# Patient Record
Sex: FEMALE | Race: WHITE | NOT HISPANIC OR LATINO | Employment: OTHER | URBAN - METROPOLITAN AREA
[De-identification: names, ages, dates, MRNs, and addresses within clinical notes are randomized per-mention and may not be internally consistent; named-entity substitution may affect disease eponyms.]

---

## 2022-12-29 ENCOUNTER — HOSPITAL ENCOUNTER (INPATIENT)
Facility: HOSPITAL | Age: 87
LOS: 3 days | Discharge: HOSPICE | DRG: 640 | End: 2023-01-06
Attending: INTERNAL MEDICINE | Admitting: INTERNAL MEDICINE
Payer: MEDICARE

## 2022-12-29 DIAGNOSIS — R29.898 LEFT LEG WEAKNESS: ICD-10-CM

## 2022-12-29 DIAGNOSIS — I63.9 STROKE: ICD-10-CM
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
## 2022-12-29 DIAGNOSIS — R29.90 STROKE-LIKE SYMPTOMS: ICD-10-CM

## 2022-12-29 DIAGNOSIS — M79.606 LEG PAIN: ICD-10-CM

## 2022-12-29 DIAGNOSIS — I63.9 CEREBROVASCULAR ACCIDENT (CVA), UNSPECIFIED MECHANISM: ICD-10-CM

## 2022-12-29 LAB
ALBUMIN SERPL-MCNC: 3.7 G/DL (ref 3.4–4.8)
ALBUMIN/GLOB SERPL: 1.2 RATIO (ref 1.1–2)
ALP SERPL-CCNC: 98 UNIT/L (ref 40–150)
ALT SERPL-CCNC: 12 UNIT/L (ref 0–55)
APPEARANCE UR: CLEAR
AST SERPL-CCNC: 20 UNIT/L (ref 5–34)
BACTERIA #/AREA URNS AUTO: ABNORMAL /HPF
BASOPHILS # BLD AUTO: 0.01 X10(3)/MCL (ref 0–0.2)
BASOPHILS NFR BLD AUTO: 0.2 %
BILIRUB UR QL STRIP.AUTO: NEGATIVE MG/DL
BILIRUBIN DIRECT+TOT PNL SERPL-MCNC: 0.5 MG/DL
BUN SERPL-MCNC: 25.7 MG/DL (ref 9.8–20.1)
CALCIUM SERPL-MCNC: 9.3 MG/DL (ref 8.4–10.2)
CHLORIDE SERPL-SCNC: 106 MMOL/L (ref 98–111)
CO2 SERPL-SCNC: 28 MMOL/L (ref 23–31)
COLOR UR AUTO: ABNORMAL
CREAT SERPL-MCNC: 1.05 MG/DL (ref 0.55–1.02)
EOSINOPHIL # BLD AUTO: 0.15 X10(3)/MCL (ref 0–0.9)
EOSINOPHIL NFR BLD AUTO: 2.9 %
ERYTHROCYTE [DISTWIDTH] IN BLOOD BY AUTOMATED COUNT: 13.5 % (ref 11–14.5)
FLUAV AG UPPER RESP QL IA.RAPID: NOT DETECTED
FLUBV AG UPPER RESP QL IA.RAPID: NOT DETECTED
GFR SERPLBLD CREATININE-BSD FMLA CKD-EPI: 49 MLS/MIN/1.73/M2
GLOBULIN SER-MCNC: 3.2 GM/DL (ref 2.4–3.5)
GLUCOSE SERPL-MCNC: 87 MG/DL (ref 75–121)
GLUCOSE UR QL STRIP.AUTO: NEGATIVE MG/DL
HCT VFR BLD AUTO: 43.1 % (ref 37–47)
HGB BLD-MCNC: 13.9 GM/DL (ref 12–16)
IMM GRANULOCYTES # BLD AUTO: 0.01 X10(3)/MCL (ref 0–0.04)
IMM GRANULOCYTES NFR BLD AUTO: 0.2 %
INR BLD: 0.98 (ref 2–3)
KETONES UR QL STRIP.AUTO: NEGATIVE MG/DL
LEUKOCYTE ESTERASE UR QL STRIP.AUTO: NEGATIVE UNIT/L
LYMPHOCYTES # BLD AUTO: 1.49 X10(3)/MCL (ref 0.6–4.6)
LYMPHOCYTES NFR BLD AUTO: 28.3 %
MAGNESIUM SERPL-MCNC: 1.9 MG/DL (ref 1.6–2.6)
MCH RBC QN AUTO: 29.8 PG
MCHC RBC AUTO-ENTMCNC: 32.3 MG/DL (ref 33–36)
MCV RBC AUTO: 92.5 FL (ref 80–94)
MONOCYTES # BLD AUTO: 0.54 X10(3)/MCL (ref 0.1–1.3)
MONOCYTES NFR BLD AUTO: 10.3 %
NEUTROPHILS # BLD AUTO: 3.06 X10(3)/MCL (ref 2.1–9.2)
NEUTROPHILS NFR BLD AUTO: 58.1 %
NITRITE UR QL STRIP.AUTO: NEGATIVE
NRBC BLD AUTO-RTO: 0 % (ref 0–1)
PH UR STRIP.AUTO: 6 [PH]
PLATELET # BLD AUTO: 163 X10(3)/MCL (ref 140–371)
PMV BLD AUTO: 9.3 FL (ref 9.4–12.4)
POTASSIUM SERPL-SCNC: 3.9 MMOL/L (ref 3.5–5.1)
PROT SERPL-MCNC: 6.9 GM/DL (ref 5.8–7.6)
PROT UR QL STRIP.AUTO: ABNORMAL MG/DL
PROTHROMBIN TIME: 12.8 SECONDS (ref 11.7–14.5)
RBC # BLD AUTO: 4.66 X10(6)/MCL (ref 4.2–5.4)
RBC #/AREA URNS AUTO: ABNORMAL /HPF
RBC UR QL AUTO: ABNORMAL UNIT/L
SARS-COV-2 RNA RESP QL NAA+PROBE: NOT DETECTED
SODIUM SERPL-SCNC: 143 MMOL/L (ref 132–146)
SP GR UR STRIP.AUTO: 1.02
SQUAMOUS #/AREA URNS AUTO: ABNORMAL /HPF
UROBILINOGEN UR STRIP-ACNC: 0.2 MG/DL
WBC # SPEC AUTO: 5.3 X10(3)/MCL (ref 4.5–11.5)
WBC #/AREA URNS AUTO: ABNORMAL /HPF

## 2022-12-29 PROCEDURE — 81003 URINALYSIS AUTO W/O SCOPE: CPT | Performed by: INTERNAL MEDICINE

## 2022-12-29 PROCEDURE — 83735 ASSAY OF MAGNESIUM: CPT | Performed by: INTERNAL MEDICINE

## 2022-12-29 PROCEDURE — 25000003 PHARM REV CODE 250: Performed by: INTERNAL MEDICINE

## 2022-12-29 PROCEDURE — 85025 COMPLETE CBC W/AUTO DIFF WBC: CPT | Performed by: INTERNAL MEDICINE

## 2022-12-29 PROCEDURE — 63600175 PHARM REV CODE 636 W HCPCS: Performed by: INTERNAL MEDICINE

## 2022-12-29 PROCEDURE — 80053 COMPREHEN METABOLIC PANEL: CPT | Performed by: INTERNAL MEDICINE

## 2022-12-29 PROCEDURE — 85610 PROTHROMBIN TIME: CPT | Performed by: INTERNAL MEDICINE

## 2022-12-29 PROCEDURE — 0240U COVID/FLU A&B PCR: CPT | Performed by: INTERNAL MEDICINE

## 2022-12-29 PROCEDURE — 96360 HYDRATION IV INFUSION INIT: CPT

## 2022-12-29 PROCEDURE — 96372 THER/PROPH/DIAG INJ SC/IM: CPT | Performed by: INTERNAL MEDICINE

## 2022-12-29 PROCEDURE — G0378 HOSPITAL OBSERVATION PER HR: HCPCS

## 2022-12-29 PROCEDURE — 99291 CRITICAL CARE FIRST HOUR: CPT

## 2022-12-29 RX ORDER — VORTIOXETINE 10 MG/1
1 TABLET, FILM COATED ORAL
Status: ON HOLD | COMMUNITY
Start: 2022-12-24 | End: 2023-01-06 | Stop reason: HOSPADM

## 2022-12-29 RX ORDER — NAPROXEN SODIUM 220 MG/1
162 TABLET, FILM COATED ORAL ONCE
Status: COMPLETED | OUTPATIENT
Start: 2022-12-29 | End: 2022-12-29

## 2022-12-29 RX ORDER — ATORVASTATIN CALCIUM 20 MG/1
TABLET, FILM COATED ORAL
COMMUNITY

## 2022-12-29 RX ORDER — ATORVASTATIN CALCIUM 10 MG/1
20 TABLET, FILM COATED ORAL NIGHTLY
Status: DISCONTINUED | OUTPATIENT
Start: 2022-12-29 | End: 2023-01-06 | Stop reason: HOSPADM

## 2022-12-29 RX ORDER — ACETAMINOPHEN 325 MG/1
650 TABLET ORAL EVERY 8 HOURS PRN
Status: DISCONTINUED | OUTPATIENT
Start: 2022-12-29 | End: 2023-01-06 | Stop reason: HOSPADM

## 2022-12-29 RX ORDER — SODIUM CHLORIDE 0.9 % (FLUSH) 0.9 %
10 SYRINGE (ML) INJECTION
Status: DISCONTINUED | OUTPATIENT
Start: 2022-12-29 | End: 2023-01-06 | Stop reason: HOSPADM

## 2022-12-29 RX ORDER — ENOXAPARIN SODIUM 100 MG/ML
30 INJECTION SUBCUTANEOUS EVERY 24 HOURS
Status: DISCONTINUED | OUTPATIENT
Start: 2022-12-29 | End: 2023-01-03

## 2022-12-29 RX ORDER — TALC
6 POWDER (GRAM) TOPICAL NIGHTLY PRN
Status: DISCONTINUED | OUTPATIENT
Start: 2022-12-29 | End: 2023-01-06 | Stop reason: HOSPADM

## 2022-12-29 RX ORDER — BUPROPION HYDROCHLORIDE 150 MG/1
150 TABLET ORAL DAILY
Status: DISCONTINUED | OUTPATIENT
Start: 2022-12-30 | End: 2023-01-06 | Stop reason: HOSPADM

## 2022-12-29 RX ORDER — LISINOPRIL 20 MG/1
TABLET ORAL
Status: ON HOLD | COMMUNITY
End: 2023-01-06 | Stop reason: HOSPADM

## 2022-12-29 RX ORDER — METOPROLOL TARTRATE 50 MG/1
50 TABLET ORAL 2 TIMES DAILY
Status: ON HOLD | COMMUNITY
Start: 2022-10-10 | End: 2023-01-06 | Stop reason: HOSPADM

## 2022-12-29 RX ORDER — BUPROPION HYDROCHLORIDE 150 MG/1
150 TABLET ORAL
Status: ON HOLD | COMMUNITY
Start: 2022-12-15 | End: 2023-01-06 | Stop reason: SDUPTHER

## 2022-12-29 RX ORDER — FUROSEMIDE 20 MG/1
TABLET ORAL
Status: ON HOLD | COMMUNITY
End: 2023-01-06 | Stop reason: HOSPADM

## 2022-12-29 RX ORDER — ACETAMINOPHEN 325 MG/1
650 TABLET ORAL
Status: DISCONTINUED | OUTPATIENT
Start: 2022-12-29 | End: 2022-12-29

## 2022-12-29 RX ORDER — AMLODIPINE BESYLATE 10 MG/1
10 TABLET ORAL
COMMUNITY
Start: 2022-12-24

## 2022-12-29 RX ORDER — ASPIRIN 325 MG
325 TABLET, DELAYED RELEASE (ENTERIC COATED) ORAL DAILY
Status: DISCONTINUED | OUTPATIENT
Start: 2022-12-30 | End: 2023-01-02

## 2022-12-29 RX ORDER — TRAMADOL HYDROCHLORIDE 50 MG/1
50 TABLET ORAL EVERY 6 HOURS PRN
Qty: 8 TABLET | Refills: 0 | Status: SHIPPED | OUTPATIENT
Start: 2022-12-29 | End: 2022-12-29

## 2022-12-29 RX ORDER — ASCORBIC ACID 500 MG
500 TABLET ORAL
Status: ON HOLD | COMMUNITY
End: 2023-01-06 | Stop reason: HOSPADM

## 2022-12-29 RX ORDER — LISINOPRIL 20 MG/1
20 TABLET ORAL DAILY
Status: DISCONTINUED | OUTPATIENT
Start: 2022-12-30 | End: 2022-12-31

## 2022-12-29 RX ORDER — ASPIRIN 81 MG/1
TABLET ORAL
COMMUNITY

## 2022-12-29 RX ORDER — ONDANSETRON 2 MG/ML
4 INJECTION INTRAMUSCULAR; INTRAVENOUS EVERY 8 HOURS PRN
Status: DISCONTINUED | OUTPATIENT
Start: 2022-12-29 | End: 2023-01-06 | Stop reason: HOSPADM

## 2022-12-29 RX ORDER — LABETALOL HCL 20 MG/4 ML
10 SYRINGE (ML) INTRAVENOUS
Status: DISCONTINUED | OUTPATIENT
Start: 2022-12-29 | End: 2022-12-30

## 2022-12-29 RX ORDER — ACETAMINOPHEN 325 MG/1
650 TABLET ORAL EVERY 6 HOURS PRN
Status: DISCONTINUED | OUTPATIENT
Start: 2022-12-29 | End: 2023-01-06 | Stop reason: HOSPADM

## 2022-12-29 RX ADMIN — ATORVASTATIN CALCIUM 20 MG: 10 TABLET, FILM COATED ORAL at 08:12

## 2022-12-29 RX ADMIN — ASPIRIN 81 MG CHEWABLE TABLET 162 MG: 81 TABLET CHEWABLE at 01:12

## 2022-12-29 RX ADMIN — SODIUM CHLORIDE 500 ML: 9 INJECTION, SOLUTION INTRAVENOUS at 11:12

## 2022-12-29 RX ADMIN — ENOXAPARIN SODIUM 30 MG: 30 INJECTION SUBCUTANEOUS at 08:12

## 2022-12-29 NOTE — ED PROVIDER NOTES
"     Source of History:  Patient, Son-in-law, moderate limitations due to dementia    Chief complaint:  Altered Mental Status (c/o increasing weakness, confusion and sleepiness X 24 hours with chills and left hip pain)      HPI:  Soila Buck is a 89 y.o. female presenting with Altered Mental Status (c/o increasing weakness, confusion and sleepiness X 24 hours with chills and left hip pain)       Difficult HPI as patient has a history of dementia, 89-year-old female nursing home resident with history of HTN, HLD, dementia, presents with generalized weakness and possible left hip injury, onset yesterday, on exam patient is complaining of left hip pain, no obvious deformity, no headache, no chest pain, no abdominal pain    Patient presents for evaluation of weakness, difficulty standing, left leg pain. Onset of symptoms was abrupt starting 1 day ago, with unchanged symptoms since that time. This episode occurred at a nursing home. The symptoms are associated with weakness of left leg.          Review of Systems   Unable to obtain due to dementia    Review of patient's allergies indicates:   Allergen Reactions    Meperidine        PMH:  As per HPI and below:    Past Medical History:   Diagnosis Date    Carotid artery syndrome     CKD (chronic kidney disease) stage 3, GFR 30-59 ml/min     GERD (gastroesophageal reflux disease)     Hypertension     Major depressive disorder, single episode, unspecified     Mild cognitive impairment with memory loss     Mixed hyperlipidemia     PVD (peripheral vascular disease)         No family history on file.    No past surgical history on file.         There is no problem list on file for this patient.       Physical Exam:    BP (!) 162/84   Pulse 69   Temp 99 °F (37.2 °C) (Oral)   Resp 18   Ht 5' 2" (1.575 m)   Wt 102.1 kg (225 lb)   LMP  (LMP Unknown)   SpO2 97%   BMI 41.15 kg/m²     Nursing note and vital signs reviewed.    General:  Alert, moderate distress complaining of " left hip pain  Skin: Normal for Ethnic Origin, No cyanosis, no obvious bruising  HEENT: Normocephalic and atraumatic, Vision unchanged, Pupils symmetric, No icterus , Nasal mucosa is pink and moist  Cardiovascular:  Regular rate and rhythm, No edema  Chest Wall: No deformity, equal chest rise  Respiratory:  Lungs are clear to auscultation, respirations are non-labored.    Musculoskeletal:  No deformity, Normal perfusion to all extremities, pain with light touch to left hip area  Gastrointestinal:  Soft, Non distended  Neurological:  Alert and oriented x 1, GCS 14, moves all 4 extremities, possible left lower extremity drift, normal speech observed.    Psychiatric:  Cooperative, appropriate mood & affect.        Labs that have been ordered have been independently reviewed and interpreted by myself.     Old Chart Reviewed.      Initial Impression/ Differential Dx:  Hypoglycemia, intoxication, CNS abnormality including CVA, TIA, SAH, HTN encephalopathy, cerebral edema, tumor, also includes metabolic causea such as hypo/hyperglycemia, electrolyte abnormality, hypothermia, thyroid disease, hypoxia, hypercarbia, medication side effect, infectious causes including meningitis, encephalitis, UTI, PNA, skin infection, viral syndrome intraabdominal infection, hypoperfusion, psychiatric disorder, migraine, psychosis       MDM:      Reviewed Nurses Note.    Reviewed Pertinent old records.    Orders Placed This Encounter    CT Head Without Contrast    X-Ray Hip 2 or 3 views Left (with Pelvis when performed)    COVID/FLU A&B PCR    CBC Auto Differential    Comprehensive Metabolic Panel    Protime-INR    Magnesium    Urinalysis, Reflex to Urine Culture Urine, Clean Catch    CBC with Differential    Urinalysis, Microscopic    Insert peripheral IV    Place in Observation    sodium chloride 0.9% bolus 500 mL 500 mL    aspirin chewable tablet 162 mg                    Labs Reviewed   COMPREHENSIVE METABOLIC PANEL - Abnormal; Notable  for the following components:       Result Value    Blood Urea Nitrogen 25.7 (*)     Creatinine 1.05 (*)     All other components within normal limits   PROTIME-INR - Abnormal; Notable for the following components:    INR 0.98 (*)     All other components within normal limits   URINALYSIS, REFLEX TO URINE CULTURE - Abnormal; Notable for the following components:    Protein, UA Trace (*)     Blood, UA Trace (*)     All other components within normal limits   CBC WITH DIFFERENTIAL - Abnormal; Notable for the following components:    MCHC 32.3 (*)     MPV 9.3 (*)     All other components within normal limits   URINALYSIS, MICROSCOPIC - Abnormal; Notable for the following components:    Squamous Epithelial Cells, UA Few (*)     All other components within normal limits    Narrative:     Urine microscopic results may be inaccurate, <12 cc sample submitted   COVID/FLU A&B PCR - Normal    Narrative:     The Xpert Xpress SARS-CoV-2/FLU/RSV plus is a rapid, multiplexed real-time PCR test intended for the simultaneous qualitative detection and differentiation of SARS-CoV-2, Influenza A, Influenza B, and respiratory syncytial virus (RSV) viral RNA in either nasopharyngeal swab or nasal swab specimens.         MAGNESIUM - Normal   CBC W/ AUTO DIFFERENTIAL    Narrative:     The following orders were created for panel order CBC Auto Differential.  Procedure                               Abnormality         Status                     ---------                               -----------         ------                     CBC with Differential[[id number]]        Abnormal            Final result                 Please view results for these tests on the individual orders.          CT Head Without Contrast   Final Result      1.  Right posterior frontal lobe nonhemorrhagic infarct may be subacute to old.      2.  Chronic microvascular ischemia and atrophy.         Electronically signed by: Avi Jones   Date:    12/29/2022    Time:    13:06      X-Ray Hip 2 or 3 views Left (with Pelvis when performed)   Final Result      No displaced fracture identified.         Electronically signed by: Gina Orellana   Date:    12/29/2022   Time:    12:57           Admission on 12/29/2022   Component Date Value Ref Range Status    Influenza A PCR 12/29/2022 Not Detected  Not Detected Final    Influenza B PCR 12/29/2022 Not Detected  Not Detected Final    SARS-CoV-2 PCR 12/29/2022 Not Detected  Not Detected Final    Sodium Level 12/29/2022 143  132 - 146 mmol/L Final    Potassium Level 12/29/2022 3.9  3.5 - 5.1 mmol/L Final    Chloride 12/29/2022 106  98 - 111 mmol/L Final    Carbon Dioxide 12/29/2022 28  23 - 31 mmol/L Final    Glucose Level 12/29/2022 87  75 - 121 mg/dL Final    Blood Urea Nitrogen 12/29/2022 25.7 (H)  9.8 - 20.1 mg/dL Final    Creatinine 12/29/2022 1.05 (H)  0.55 - 1.02 mg/dL Final    Calcium Level Total 12/29/2022 9.3  8.4 - 10.2 mg/dL Final    Protein Total 12/29/2022 6.9  5.8 - 7.6 gm/dL Final    Albumin Level 12/29/2022 3.7  3.4 - 4.8 g/dL Final    Globulin 12/29/2022 3.2  2.4 - 3.5 gm/dL Final    Albumin/Globulin Ratio 12/29/2022 1.2  1.1 - 2.0 ratio Final    Bilirubin Total 12/29/2022 0.5  <=1.5 mg/dL Final    Alkaline Phosphatase 12/29/2022 98  40 - 150 unit/L Final    Alanine Aminotransferase 12/29/2022 12  0 - 55 unit/L Final    Aspartate Aminotransferase 12/29/2022 20  5 - 34 unit/L Final    eGFR 12/29/2022 49  mls/min/1.73/m2 Final    PT 12/29/2022 12.8  11.7 - 14.5 seconds Final    INR 12/29/2022 0.98 (L)  2.00 - 3.00 Final    Magnesium Level 12/29/2022 1.90  1.60 - 2.60 mg/dL Final    Color, UA 12/29/2022 Straw  Yellow, Light-Yellow, Dark Yellow, Cassidy, Straw Final    Appearance, UA 12/29/2022 Clear  Clear Final    Specific Gravity, UA 12/29/2022 1.025   Final    pH, UA 12/29/2022 6.0  5.0 - 8.5 Final    Protein, UA 12/29/2022 Trace (A)  Negative mg/dL Final    Glucose, UA 12/29/2022 Negative  Negative, Normal mg/dL  Final    Ketones, UA 12/29/2022 Negative  Negative mg/dL Final    Blood, UA 12/29/2022 Trace (A)  Negative unit/L Final    Bilirubin, UA 12/29/2022 Negative  Negative mg/dL Final    Urobilinogen, UA 12/29/2022 0.2  0.2, 1.0, Normal mg/dL Final    Nitrites, UA 12/29/2022 Negative  Negative Final    Leukocyte Esterase, UA 12/29/2022 Negative  Negative unit/L Final    WBC 12/29/2022 5.3  4.5 - 11.5 x10(3)/mcL Final    RBC 12/29/2022 4.66  4.20 - 5.40 x10(6)/mcL Final    Hgb 12/29/2022 13.9  12.0 - 16.0 gm/dL Final    Hct 12/29/2022 43.1  37.0 - 47.0 % Final    MCV 12/29/2022 92.5  80.0 - 94.0 fL Final    MCH 12/29/2022 29.8  pg Final    MCHC 12/29/2022 32.3 (L)  33.0 - 36.0 mg/dL Final    RDW 12/29/2022 13.5  11.0 - 14.5 % Final    Platelet 12/29/2022 163  140 - 371 x10(3)/mcL Final    MPV 12/29/2022 9.3 (L)  9.4 - 12.4 fL Final    Neut % 12/29/2022 58.1  % Final    Lymph % 12/29/2022 28.3  % Final    Mono % 12/29/2022 10.3  % Final    Eos % 12/29/2022 2.9  % Final    Basophil % 12/29/2022 0.2  % Final    Lymph # 12/29/2022 1.49  0.6 - 4.6 x10(3)/mcL Final    Neut # 12/29/2022 3.06  2.1 - 9.2 x10(3)/mcL Final    Mono # 12/29/2022 0.54  0.1 - 1.3 x10(3)/mcL Final    Eos # 12/29/2022 0.15  0 - 0.9 x10(3)/mcL Final    Baso # 12/29/2022 0.01  0 - 0.2 x10(3)/mcL Final    IG# 12/29/2022 0.01  0 - 0.04 x10(3)/mcL Final    IG% 12/29/2022 0.2  % Final    NRBC% 12/29/2022 0.0  0 - 1 % Final    Bacteria, UA 12/29/2022 None Seen  None Seen, Rare, Occasional /HPF Final    RBC, UA 12/29/2022 0-2  None Seen, 0-2, 3-5, 0-5 /HPF Final    WBC, UA 12/29/2022 0-2  None Seen, 0-2, 3-5, 0-5 /HPF Final    Squamous Epithelial Cells, UA 12/29/2022 Few (A)  None Seen, Rare, Occasional, Occ /HPF Final       Imaging Results              CT Head Without Contrast (Final result)  Result time 12/29/22 13:06:08      Final result by Avi Jones MD (12/29/22 13:06:08)                   Impression:      1.  Right posterior frontal lobe nonhemorrhagic  infarct may be subacute to old.    2.  Chronic microvascular ischemia and atrophy.      Electronically signed by: Avi Jones  Date:    12/29/2022  Time:    13:06               Narrative:    EXAMINATION:  CT HEAD WITHOUT CONTRAST    CLINICAL HISTORY:  Altered mental status;    TECHNIQUE:  Sequential axial images were performed of the brain without contrast.    Dose product length of 954 mGycm. Automated exposure control was utilized to minimize radiation dose.    COMPARISON:  None available.    FINDINGS:  There is no intracranial mass effect, midline shift, hydrocephalus or hemorrhage.  There is right posterior frontal lobe subcortical infarct which may be subacute to old.  If clinically indicated, consideration may be given to further assessment with MRI of the brain.  Chronic appearing periventricular and subcortical white matter low attenuation changes are present and are consistent with chronic microangiopathic ischemia. The ventricular system and sulcal markings prominence is consistent with atrophy. There is no acute extra axial fluid collection. Visualized paranasal sinuses are clear without mucosal thickening, polypoidal abnormality or air-fluid levels. Mastoid air cells aeration is optimal.                                       X-Ray Hip 2 or 3 views Left (with Pelvis when performed) (Final result)  Result time 12/29/22 12:57:04      Final result by Gina Orellana MD (12/29/22 12:57:04)                   Impression:      No displaced fracture identified.      Electronically signed by: Gina Orellana  Date:    12/29/2022  Time:    12:57               Narrative:    EXAMINATION:  XR HIP WITH PELVIS WHEN PERFORMED, 2 OR 3 VIEWS LEFT    CLINICAL HISTORY:  Unspecified fall, initial encounter    COMPARISON:  X-rays dated 08/17/2016    FINDINGS:  There is no displaced fracture identified.  There are degenerative changes of the hips with joint space narrowing and osteophyte formation.  The soft tissues are  unremarkable.                                                                     Critical Care    Date/Time: 12/29/2022 3:06 PM  Performed by: Donte Stevens DO  Authorized by: Diaz De MD   Total critical care time (exclusive of procedural time) : 40 minutes  Critical care was necessary to treat or prevent imminent or life-threatening deterioration of the following conditions: CNS failure or compromise.  Critical care was time spent personally by me on the following activities: evaluation of patient's response to treatment, examination of patient, obtaining history from patient or surrogate, ordering and performing treatments and interventions, ordering and review of laboratory studies, ordering and review of radiographic studies, pulse oximetry, re-evaluation of patient's condition and review of old charts.            Diagnostic Impression:    1. Fall    2. Stroke-like symptoms    3. Left leg weakness         ED Disposition Condition    Transfer to Another Facility Stable             Follow-up Information       [facility] - Emergency Dept.    Specialty: Emergency Medicine  Why: If symptoms worsen  Contact information:  [address]  [facility] [id number] [phone]                                  Donte Stevens DO  12/29/22 1509

## 2022-12-29 NOTE — Clinical Note
Diagnosis: Fall [395434]   Future Attending Provider: DANIEL MEDNOZA [[id number]]   Admitting Provider:: DANIEL MENDOZA [[id number]]

## 2022-12-30 LAB
ALBUMIN SERPL-MCNC: 3.4 G/DL (ref 3.4–4.8)
ALBUMIN/GLOB SERPL: 1.3 RATIO (ref 1.1–2)
ALP SERPL-CCNC: 85 UNIT/L (ref 40–150)
ALT SERPL-CCNC: 11 UNIT/L (ref 0–55)
APTT PPP: 30.2 SECONDS (ref 23.4–33.9)
AST SERPL-CCNC: 17 UNIT/L (ref 5–34)
BASOPHILS # BLD AUTO: 0.01 X10(3)/MCL (ref 0–0.2)
BASOPHILS NFR BLD AUTO: 0.2 %
BILIRUBIN DIRECT+TOT PNL SERPL-MCNC: 0.7 MG/DL
BUN SERPL-MCNC: 24.5 MG/DL (ref 9.8–20.1)
CALCIUM SERPL-MCNC: 9 MG/DL (ref 8.4–10.2)
CHLORIDE SERPL-SCNC: 107 MMOL/L (ref 98–111)
CHOLEST SERPL-MCNC: 170 MG/DL
CHOLEST/HDLC SERPL: 4 {RATIO} (ref 0–5)
CK MB SERPL-MCNC: 2 NG/ML
CO2 SERPL-SCNC: 30 MMOL/L (ref 23–31)
CREAT SERPL-MCNC: 1.01 MG/DL (ref 0.55–1.02)
EOSINOPHIL # BLD AUTO: 0.17 X10(3)/MCL (ref 0–0.9)
EOSINOPHIL NFR BLD AUTO: 3.8 %
ERYTHROCYTE [DISTWIDTH] IN BLOOD BY AUTOMATED COUNT: 13.3 % (ref 11–14.5)
GFR SERPLBLD CREATININE-BSD FMLA CKD-EPI: 51 MLS/MIN/1.73/M2
GLOBULIN SER-MCNC: 2.6 GM/DL (ref 2.4–3.5)
GLUCOSE SERPL-MCNC: 88 MG/DL (ref 75–121)
HCT VFR BLD AUTO: 39.2 % (ref 37–47)
HDLC SERPL-MCNC: 42 MG/DL (ref 35–60)
HGB BLD-MCNC: 12.8 GM/DL (ref 12–16)
IMM GRANULOCYTES # BLD AUTO: 0 X10(3)/MCL (ref 0–0.04)
IMM GRANULOCYTES NFR BLD AUTO: 0 %
INR BLD: 1.02 (ref 2–3)
LDLC SERPL CALC-MCNC: 95 MG/DL (ref 50–140)
LYMPHOCYTES # BLD AUTO: 1.34 X10(3)/MCL (ref 0.6–4.6)
LYMPHOCYTES NFR BLD AUTO: 30.3 %
MAGNESIUM SERPL-MCNC: 1.8 MG/DL (ref 1.6–2.6)
MCH RBC QN AUTO: 30 PG
MCHC RBC AUTO-ENTMCNC: 32.7 MG/DL (ref 33–36)
MCV RBC AUTO: 92 FL (ref 80–94)
MONOCYTES # BLD AUTO: 0.52 X10(3)/MCL (ref 0.1–1.3)
MONOCYTES NFR BLD AUTO: 11.8 %
NEUTROPHILS # BLD AUTO: 2.38 X10(3)/MCL (ref 2.1–9.2)
NEUTROPHILS NFR BLD AUTO: 53.9 %
NRBC BLD AUTO-RTO: 0 % (ref 0–1)
PHOSPHATE SERPL-MCNC: 3.5 MG/DL (ref 2.3–4.7)
PLATELET # BLD AUTO: 135 X10(3)/MCL (ref 140–371)
PMV BLD AUTO: 9.5 FL (ref 9.4–12.4)
POTASSIUM SERPL-SCNC: 3.5 MMOL/L (ref 3.5–5.1)
PROT SERPL-MCNC: 6 GM/DL (ref 5.8–7.6)
PROTHROMBIN TIME: 13.2 SECONDS (ref 11.7–14.5)
RBC # BLD AUTO: 4.26 X10(6)/MCL (ref 4.2–5.4)
SODIUM SERPL-SCNC: 147 MMOL/L (ref 132–146)
TRIGL SERPL-MCNC: 163 MG/DL (ref 37–140)
TROPONIN I SERPL-MCNC: 0.05 NG/ML (ref 0–0.04)
TSH SERPL-ACNC: 2.57 UIU/ML (ref 0.35–4.94)
VLDLC SERPL CALC-MCNC: 33 MG/DL
WBC # SPEC AUTO: 4.4 X10(3)/MCL (ref 4.5–11.5)

## 2022-12-30 PROCEDURE — 84484 ASSAY OF TROPONIN QUANT: CPT | Performed by: INTERNAL MEDICINE

## 2022-12-30 PROCEDURE — 84100 ASSAY OF PHOSPHORUS: CPT | Performed by: INTERNAL MEDICINE

## 2022-12-30 PROCEDURE — 85730 THROMBOPLASTIN TIME PARTIAL: CPT | Performed by: INTERNAL MEDICINE

## 2022-12-30 PROCEDURE — G0378 HOSPITAL OBSERVATION PER HR: HCPCS

## 2022-12-30 PROCEDURE — 80061 LIPID PANEL: CPT | Performed by: INTERNAL MEDICINE

## 2022-12-30 PROCEDURE — 82553 CREATINE MB FRACTION: CPT | Performed by: INTERNAL MEDICINE

## 2022-12-30 PROCEDURE — 92523 SPEECH SOUND LANG COMPREHEN: CPT

## 2022-12-30 PROCEDURE — 25000003 PHARM REV CODE 250: Performed by: INTERNAL MEDICINE

## 2022-12-30 PROCEDURE — 83735 ASSAY OF MAGNESIUM: CPT | Performed by: INTERNAL MEDICINE

## 2022-12-30 PROCEDURE — 63600175 PHARM REV CODE 636 W HCPCS: Performed by: INTERNAL MEDICINE

## 2022-12-30 PROCEDURE — 84443 ASSAY THYROID STIM HORMONE: CPT | Performed by: INTERNAL MEDICINE

## 2022-12-30 PROCEDURE — 85610 PROTHROMBIN TIME: CPT | Performed by: INTERNAL MEDICINE

## 2022-12-30 PROCEDURE — 80053 COMPREHEN METABOLIC PANEL: CPT | Performed by: INTERNAL MEDICINE

## 2022-12-30 PROCEDURE — 96372 THER/PROPH/DIAG INJ SC/IM: CPT | Performed by: INTERNAL MEDICINE

## 2022-12-30 PROCEDURE — 85025 COMPLETE CBC W/AUTO DIFF WBC: CPT | Performed by: INTERNAL MEDICINE

## 2022-12-30 RX ORDER — LABETALOL HYDROCHLORIDE 5 MG/ML
10 INJECTION, SOLUTION INTRAVENOUS
Status: DISCONTINUED | OUTPATIENT
Start: 2022-12-30 | End: 2022-12-31

## 2022-12-30 RX ORDER — SODIUM CHLORIDE 450 MG/100ML
INJECTION, SOLUTION INTRAVENOUS CONTINUOUS
Status: ACTIVE | OUTPATIENT
Start: 2022-12-30 | End: 2022-12-31

## 2022-12-30 RX ORDER — METOPROLOL TARTRATE 50 MG/1
50 TABLET ORAL 2 TIMES DAILY
Status: DISCONTINUED | OUTPATIENT
Start: 2022-12-30 | End: 2023-01-03

## 2022-12-30 RX ADMIN — METOPROLOL TARTRATE 50 MG: 50 TABLET, FILM COATED ORAL at 01:12

## 2022-12-30 RX ADMIN — ENOXAPARIN SODIUM 30 MG: 30 INJECTION SUBCUTANEOUS at 05:12

## 2022-12-30 RX ADMIN — LISINOPRIL 20 MG: 20 TABLET ORAL at 09:12

## 2022-12-30 RX ADMIN — ATORVASTATIN CALCIUM 20 MG: 10 TABLET, FILM COATED ORAL at 09:12

## 2022-12-30 RX ADMIN — SODIUM CHLORIDE: 4.5 INJECTION, SOLUTION INTRAVENOUS at 02:12

## 2022-12-30 RX ADMIN — ASPIRIN 325 MG: 325 TABLET, COATED ORAL at 09:12

## 2022-12-30 RX ADMIN — METOPROLOL TARTRATE 50 MG: 50 TABLET, FILM COATED ORAL at 09:12

## 2022-12-30 RX ADMIN — BUPROPION HYDROCHLORIDE 150 MG: 150 TABLET, FILM COATED, EXTENDED RELEASE ORAL at 09:12

## 2022-12-30 NOTE — HPI
Soila Buck is a 89 y.o. female who  has a past medical history of Carotid artery syndrome, CKD (chronic kidney disease) stage 3, GFR 30-59 ml/min, GERD (gastroesophageal reflux disease), Hypertension, Major depressive disorder, single episode, unspecified, Mild cognitive impairment with memory loss, Mixed hyperlipidemia, and PVD (peripheral vascular disease).. The patient presented to [facility] on 12/29/2022.     She was sent from her nursing home due to AMS, recent fall and left leg weakness. The pt has a history of dementia but she tired to answer all my questions. ER staff spoke to her son who reports had increased weakness, confusion, drowsiness and left hip pain for 1 day.  She also seemed to be weaker in her left leg.  The pt says she fell but couldn't say when. She reports mild pain in her left hip, knee and foot but she moved it without discomfort. ER workup didn't show a hip fracture. CT Head did find: Right posterior frontal lobe nonhemorrhagic infarct may be subacute to old. The pt had no facial droop or slurred speech during my eval. She denies hx of CVA or MI. She is generally weak on exam with her left leg slightly worse than her right.

## 2022-12-30 NOTE — PT/OT/SLP PROGRESS
Physical Therapy      Patient Name:  Soila Buck   MRN:  [id number]    PT eval and treat orders received. Per SLP Gunjan's report, pt unable to stay awake at this time, falling asleep while SLP was taking pts history. Pt not appropriate to participate with skilled PT services at this time. Will continue to follow and reattempt as scheduling allows.

## 2022-12-30 NOTE — PT/OT/SLP PROGRESS
Occupational Therapy      Patient Name:  Soila Buck   MRN:  [id number]    OT orders received. Per Gunjan KELLER, patient was not able to stay awake during evaluation in ED. Patient is not appropriate to treat at this time due to inability to stay awake for assessment. Will follow up and re-attempt scheduling for a more appropriate time.     12/30/2022

## 2022-12-30 NOTE — PT/OT/SLP EVAL
Speech Language Pathology Evaluation  Cognitive Communication    Patient Name:  Soila Buck   MRN:  [id number]  Admitting Diagnosis: CVA (cerebral vascular accident), passed [place]    Recommendations:     Recommendations:                General Recommendations:  Cognitive-linguistic evaluation  General Precautions: Standard,    Communication strategies:  go to room if call light pushed    History:     Past Medical History:   Diagnosis Date    Carotid artery syndrome     CKD (chronic kidney disease) stage 3, GFR 30-59 ml/min     GERD (gastroesophageal reflux disease)     Hypertension     Major depressive disorder, single episode, unspecified     Mild cognitive impairment with memory loss     Mixed hyperlipidemia     PVD (peripheral vascular disease)        History reviewed. No pertinent surgical history.    Subjective     Pt sitting in bed, fatigued but wakes to verbal stimuli.    Pain/Comfort:  Pain Rating 1: 0/10    Objective:   SPEECH PRODUCTION  Phoneme Production: Adequate  Voice Quality: Adequate  Voice Production: Adequate  Speech Rate: Adequate  Loudness: Acceptable  Respiration: Adequate  Resonance: Adequate  Prosody: Adequate  Speech Intelligibility  Known Context: Greater that 90%  Unknown Context: Greater that 90%    AUDITORY COMPREHENSION  Following Directions:  1-Step: 80%  2-Step: 40%    Yes/No Questions:  Biographical: 70%    COGNITION  Orientation:  Person: Identifies self  Place: Impaired  Time: Impaired; inconsistent  Situation: Impaired      Assessment:   Unable to complete speech, language, and cognition evaluation on this date due to level of fatigue. Pt agreeable to participate but unable to maintain alertness. SLP to continue evaluation with goals and results to be updated accordingly.    Plan:     Plan of Care reviewed with:  patient   SLP Follow-Up:  Yes       Discharge recommendations:  Discharge Facility/Level of Care Needs: acute care hospital   Barriers to Discharge:  Level of Skilled  Assistance Needed      Time Tracking:   SLP Treatment Date:   12/30/22  Speech Start Time:  1415  Speech Stop Time:  1435     Speech Total Time (min):  20 min    Billable Minutes: Eval 20 minutes     12/30/2022

## 2022-12-30 NOTE — H&P
[facility] EMERGENCY DEPARTMENT    Hospital Medicine History & Physical Examination       Patient Name: Soila Buck  MRN: [id number]  Patient Class: OP- Observation   Admission Date: 12/29/2022   Admitting Physician: NERY Service   Length of Stay: 0  Attending Physician: Yair Huntley MD  Primary Care Provider: Helio Madden MD  Face-to-Face encounter date: 12/29/2022    Code Status: Partial Code    Chief Complaint: Altered Mental Status (c/o increasing weakness, confusion and sleepiness X 24 hours with chills and left hip pain)          HISTORY OF PRESENT ILLNESS:   Soila Buck is a 89 y.o. female who  has a past medical history of Carotid artery syndrome, CKD (chronic kidney disease) stage 3, GFR 30-59 ml/min, GERD (gastroesophageal reflux disease), Hypertension, Major depressive disorder, single episode, unspecified, Mild cognitive impairment with memory loss, Mixed hyperlipidemia, and PVD (peripheral vascular disease).. The patient presented to [facility] on 12/29/2022.    She was sent from her nursing home due to AMS, recent fall and left leg weakness. The pt has a history of dementia but she tired to answer all my questions. ER staff spoke to her son who reports had increased weakness, confusion, drowsiness and left hip pain for 1 day.  She also seemed to be weaker in her left leg.  The pt says she fell but couldn't say when. She reports mild pain in her left hip, knee and foot but she moved it without discomfort. ER workup didn't show a hip fracture. CT Head did find: Right posterior frontal lobe nonhemorrhagic infarct may be subacute to old. The pt had no facial droop or slurred speech during my eval. She denies hx of CVA or MI. She is generally weak on exam with her left leg slightly worse than her right.    PAST MEDICAL HISTORY:     Past Medical History:   Diagnosis Date    Carotid artery syndrome     CKD (chronic kidney disease) stage 3, GFR 30-59 ml/min     GERD  (gastroesophageal reflux disease)     Hypertension     Major depressive disorder, single episode, unspecified     Mild cognitive impairment with memory loss     Mixed hyperlipidemia     PVD (peripheral vascular disease)        PAST SURGICAL HISTORY:   History reviewed. No pertinent surgical history.  PT denies    ALLERGIES:   Meperidine    FAMILY HISTORY:   family history is not on file.  Pt denies    SOCIAL HISTORY:     Social History     Tobacco Use    Smoking status: Never    Smokeless tobacco: Never   Substance Use Topics    Alcohol use: Never        HOME MEDICATIONS:     Prior to Admission medications    Medication Sig Start Date End Date Taking? Authorizing Provider   amLODIPine (NORVASC) 10 MG tablet Take 10 mg by mouth. 12/24/22   Historical Provider   ascorbic acid, vitamin C, (VITAMIN C) 500 MG tablet 500 mg.    Historical Provider   aspirin (ECOTRIN) 81 MG EC tablet aspirin 81 mg tablet,delayed release   Take 1 tablet every day by oral route in the morning for 30 days.    Historical Provider   atorvastatin (LIPITOR) 20 MG tablet atorvastatin 20 mg tablet   TAKE 1 TABLET AT BEDTIME    Historical Provider   buPROPion (WELLBUTRIN XL) 150 MG TB24 tablet Take 150 mg by mouth. 12/15/22   Historical Provider   furosemide (LASIX) 20 MG tablet furosemide 20 mg tablet   Take 1 tablet every day by oral route as needed for 30 days.    Historical Provider   lisinopriL (PRINIVIL,ZESTRIL) 20 MG tablet lisinopril 20 mg tablet   TAKE 1 TABLET ONCE DAILY    Historical Provider   metoprolol tartrate (LOPRESSOR) 50 MG tablet Take 50 mg by mouth 2 (two) times daily. 10/10/22   Historical Provider   TRINTELLIX 10 mg Tab Take 1 tablet by mouth. 12/24/22   Historical Provider   traMADoL (ULTRAM) 50 mg tablet Take 1 tablet (50 mg total) by mouth every 6 (six) hours as needed for Pain. 12/29/22 12/29/22  Donte Stevens,        REVIEW OF SYSTEMS:   Except as documented, all other systems reviewed and negative   Review of  Systems   All other systems reviewed and are negative.      PHYSICAL EXAM:     VITAL SIGNS: 24 HRS MIN & MAX LAST   Temp  Min: 99 °F (37.2 °C)  Max: 99 °F (37.2 °C) 99 °F (37.2 °C)   BP  Min: 154/81  Max: 187/86 (!) 179/77     Pulse  Min: 54  Max: 69  (!) 57     Resp  Min: 12  Max: 20 19   SpO2  Min: 97 %  Max: 98 % 98 %       General appearance: elderly obese female in no apparent distress.  HENT: Atraumatic head. Moist mucous membranes of oral cavity.  Eyes: Normal extraocular movements. Anicteric sclera.  Neck: Supple. No LAD.  Lungs: Clear to auscultation bilaterally. No wheezing present.   Heart: Regular rate and rhythm. S1 and S2 present with no murmurs/gallop/rub. No pedal edema. No JVD present.   Abdomen: Soft, non-distended, mild periumbilical tenderness. No rebound tenderness/guarding. Bowel sounds are normal.   Extremities: No cyanosis, clubbing, or edema. Mild left hip and foot pain on palpation but full ROM, gait not tested  Skin: No Rash.   Neuro: Motor exams grossly intact. Good tone. Muscle strength 4/5 in all 4 extremities. Left leg extension weaker than on right. Pt reports numbness in dorsum of left foot.  Psych/mental status: Appropriate mood and affect. Responds appropriately to questions. Alert and oriented to to person and city, not year.    LABS AND IMAGING:     Recent Labs   Lab 12/29/22  1152   WBC 5.3   RBC 4.66   HGB 13.9   HCT 43.1   MCV 92.5   MCH 29.8   MCHC 32.3*   RDW 13.5      MPV 9.3*       Recent Labs   Lab 12/29/22  1152      K 3.9   CO2 28   BUN 25.7*   CREATININE 1.05*   CALCIUM 9.3   MG 1.90   ALBUMIN 3.7   ALKPHOS 98   ALT 12   AST 20   BILITOT 0.5     Recent Results (from the past 24 hour(s))   COVID/FLU A&B PCR    Collection Time: 12/29/22 11:27 AM   Result Value Ref Range    Influenza A PCR Not Detected Not Detected    Influenza B PCR Not Detected Not Detected    SARS-CoV-2 PCR Not Detected Not Detected   Urinalysis, Reflex to Urine Culture Urine, Clean Catch     Collection Time: 12/29/22 11:27 AM    Specimen: Urine, Clean Catch   Result Value Ref Range    Color, UA Straw Yellow, Light-Yellow, Dark Yellow, Cassidy, Straw    Appearance, UA Clear Clear    Specific Gravity, UA 1.025     pH, UA 6.0 5.0 - 8.5    Protein, UA Trace (A) Negative mg/dL    Glucose, UA Negative Negative, Normal mg/dL    Ketones, UA Negative Negative mg/dL    Blood, UA Trace (A) Negative unit/L    Bilirubin, UA Negative Negative mg/dL    Urobilinogen, UA 0.2 0.2, 1.0, Normal mg/dL    Nitrites, UA Negative Negative    Leukocyte Esterase, UA Negative Negative unit/L   Urinalysis, Microscopic    Collection Time: 12/29/22 11:27 AM   Result Value Ref Range    Bacteria, UA None Seen None Seen, Rare, Occasional /HPF    RBC, UA 0-2 None Seen, 0-2, 3-5, 0-5 /HPF    WBC, UA 0-2 None Seen, 0-2, 3-5, 0-5 /HPF    Squamous Epithelial Cells, UA Few (A) None Seen, Rare, Occasional, Occ /HPF   Comprehensive Metabolic Panel    Collection Time: 12/29/22 11:52 AM   Result Value Ref Range    Sodium Level 143 132 - 146 mmol/L    Potassium Level 3.9 3.5 - 5.1 mmol/L    Chloride 106 98 - 111 mmol/L    Carbon Dioxide 28 23 - 31 mmol/L    Glucose Level 87 75 - 121 mg/dL    Blood Urea Nitrogen 25.7 (H) 9.8 - 20.1 mg/dL    Creatinine 1.05 (H) 0.55 - 1.02 mg/dL    Calcium Level Total 9.3 8.4 - 10.2 mg/dL    Protein Total 6.9 5.8 - 7.6 gm/dL    Albumin Level 3.7 3.4 - 4.8 g/dL    Globulin 3.2 2.4 - 3.5 gm/dL    Albumin/Globulin Ratio 1.2 1.1 - 2.0 ratio    Bilirubin Total 0.5 <=1.5 mg/dL    Alkaline Phosphatase 98 40 - 150 unit/L    Alanine Aminotransferase 12 0 - 55 unit/L    Aspartate Aminotransferase 20 5 - 34 unit/L    eGFR 49 mls/min/1.73/m2   Protime-INR    Collection Time: 12/29/22 11:52 AM   Result Value Ref Range    PT 12.8 11.7 - 14.5 seconds    INR 0.98 (L) 2.00 - 3.00   Magnesium    Collection Time: 12/29/22 11:52 AM   Result Value Ref Range    Magnesium Level 1.90 1.60 - 2.60 mg/dL   CBC with Differential    Collection  Time: 12/29/22 11:52 AM   Result Value Ref Range    WBC 5.3 4.5 - 11.5 x10(3)/mcL    RBC 4.66 4.20 - 5.40 x10(6)/mcL    Hgb 13.9 12.0 - 16.0 gm/dL    Hct 43.1 37.0 - 47.0 %    MCV 92.5 80.0 - 94.0 fL    MCH 29.8 pg    MCHC 32.3 (L) 33.0 - 36.0 mg/dL    RDW 13.5 11.0 - 14.5 %    Platelet 163 140 - 371 x10(3)/mcL    MPV 9.3 (L) 9.4 - 12.4 fL    Neut % 58.1 %    Lymph % 28.3 %    Mono % 10.3 %    Eos % 2.9 %    Basophil % 0.2 %    Lymph # 1.49 0.6 - 4.6 x10(3)/mcL    Neut # 3.06 2.1 - 9.2 x10(3)/mcL    Mono # 0.54 0.1 - 1.3 x10(3)/mcL    Eos # 0.15 0 - 0.9 x10(3)/mcL    Baso # 0.01 0 - 0.2 x10(3)/mcL    IG# 0.01 0 - 0.04 x10(3)/mcL    IG% 0.2 %    NRBC% 0.0 0 - 1 %         Microbiology Results (last 7 days)       ** No results found for the last 168 hours. **             CT Head Without Contrast  Narrative: EXAMINATION:  CT HEAD WITHOUT CONTRAST    CLINICAL HISTORY:  Altered mental status;    TECHNIQUE:  Sequential axial images were performed of the brain without contrast.    Dose product length of 954 mGycm. Automated exposure control was utilized to minimize radiation dose.    COMPARISON:  None available.    FINDINGS:  There is no intracranial mass effect, midline shift, hydrocephalus or hemorrhage.  There is right posterior frontal lobe subcortical infarct which may be subacute to old.  If clinically indicated, consideration may be given to further assessment with MRI of the brain.  Chronic appearing periventricular and subcortical white matter low attenuation changes are present and are consistent with chronic microangiopathic ischemia. The ventricular system and sulcal markings prominence is consistent with atrophy. There is no acute extra axial fluid collection. Visualized paranasal sinuses are clear without mucosal thickening, polypoidal abnormality or air-fluid levels. Mastoid air cells aeration is optimal.  Impression: 1.  Right posterior frontal lobe nonhemorrhagic infarct may be subacute to old.    2.   Chronic microvascular ischemia and atrophy.    Electronically signed by: Avi Jones  Date:    12/29/2022  Time:    13:06  X-Ray Hip 2 or 3 views Left (with Pelvis when performed)  Narrative: EXAMINATION:  XR HIP WITH PELVIS WHEN PERFORMED, 2 OR 3 VIEWS LEFT    CLINICAL HISTORY:  Unspecified fall, initial encounter    COMPARISON:  X-rays dated 08/17/2016    FINDINGS:  There is no displaced fracture identified.  There are degenerative changes of the hips with joint space narrowing and osteophyte formation.  The soft tissues are unremarkable.  Impression: No displaced fracture identified.    Electronically signed by: Gina Orellana  Date:    12/29/2022  Time:    12:57        __________________________________________________________________________  INPATIENT LIST OF MEDICATIONS     Scheduled Meds:   [START ON 12/30/2022] aspirin  325 mg Oral Daily    atorvastatin  20 mg Oral QHS    [START ON 12/30/2022] buPROPion  150 mg Oral Daily    enoxaparin  30 mg Subcutaneous Daily    [START ON 12/30/2022] lisinopriL  20 mg Oral Daily     Continuous Infusions:  PRN Meds:acetaminophen, acetaminophen, labetalol, melatonin, ondansetron, sodium chloride 0.9%, sodium chloride 0.9%          ASSESSMENT & PLAN:     Right posterior frontal lobe nonhemorrhagic infarct may be subacute to old.   Fall  Left leg pain, weakness  Hx of HTN  HLP  Hx of dementia    Plan  Complete stroke workup  MRI brain and carotid u/s pending  Consult therapy teams  Consult neurology to eval results        Yair Huntley MD   12/29/2022

## 2022-12-30 NOTE — PROGRESS NOTES
[facility] EMERGENCY DEPARTMENT  Hospital Medicine Progress Note      Patient Name: Soila Buck  MRN: [id number]  Admission Date: 12/29/2022   Length of Stay: 0  Attending Physician: Yair Huntley MD  Primary Care Provider: Helio Madden MD  Face-to-Face encounter date: 12/30/2022    Code Status: Partial Code        Chief Complaint:   Altered Mental Status (c/o increasing weakness, confusion and sleepiness X 24 hours with chills and left hip pain)        HPI:   Soila Buck is a 89 y.o. female who  has a past medical history of Carotid artery syndrome, CKD (chronic kidney disease) stage 3, GFR 30-59 ml/min, GERD (gastroesophageal reflux disease), Hypertension, Major depressive disorder, single episode, unspecified, Mild cognitive impairment with memory loss, Mixed hyperlipidemia, and PVD (peripheral vascular disease).. The patient presented to [facility] on 12/29/2022.     She was sent from her nursing home due to AMS, recent fall and left leg weakness. The pt has a history of dementia but she tired to answer all my questions. ER staff spoke to her son who reports had increased weakness, confusion, drowsiness and left hip pain for 1 day.  She also seemed to be weaker in her left leg.  The pt says she fell but couldn't say when. She reports mild pain in her left hip, knee and foot but she moved it without discomfort. ER workup didn't show a hip fracture. CT Head did find: Right posterior frontal lobe nonhemorrhagic infarct may be subacute to old. The pt had no facial droop or slurred speech during my eval. She denies hx of CVA or MI. She is generally weak on exam with her left leg slightly worse than her right.     Overview/Hospital Course:  No notes on file       Interval Hx:   The pt still has left foot pain, reports less left leg numbness. She has no other c/o. She tolerated a clear diet. Appetite fair.    Review of Systems   Unable to perform ROS: Dementia   All other  systems reviewed and are negative.    Objective/physical exam:  General: obese elederly femaile, In no acute distress, afebrile  Chest: Clear to auscultation bilaterally  Heart: RRR, +S1, S2, no appreciable murmur  Abdomen: Soft, nontender, BS +  MSK: Warm, no lower extremity edema, no clubbing or cyanosis  Neurologic: Alert and oriented to person, not time or place -she thought she was in [place]    VITAL SIGNS: 24 HRS MIN & MAX LAST   No data recorded 99 °F (37.2 °C)   BP  Min: 123/67  Max: 183/84 (!) 183/84     Pulse  Min: 54  Max: 73  73   Resp  Min: 12  Max: 19 17   SpO2  Min: 95 %  Max: 99 % 99 %       Recent Labs   Lab 12/29/22  1152 12/30/22  0620   WBC 5.3 4.4*   RBC 4.66 4.26   HGB 13.9 12.8   HCT 43.1 39.2   MCV 92.5 92.0   MCH 29.8 30.0   MCHC 32.3* 32.7*   RDW 13.5 13.3    135*   MPV 9.3* 9.5       Recent Labs   Lab 12/29/22  1152 12/30/22  0620    147*   K 3.9 3.5   CO2 28 30   BUN 25.7* 24.5*   CREATININE 1.05* 1.01   CALCIUM 9.3 9.0   MG 1.90 1.80   ALBUMIN 3.7 3.4   ALKPHOS 98 85   ALT 12 11   AST 20 17   BILITOT 0.5 0.7        Microbiology Results (last 7 days)       ** No results found for the last 168 hours. **             Radiology:  US Carotid Bilateral  Narrative: EXAMINATION:  US CAROTID BILATERAL    CLINICAL HISTORY:  Cerebral infarction, unspecified    TECHNIQUE:  Gray-scale ultrasound, Color Doppler, and spectral Doppler evaluation of bilateral extracranial carotid artery systems and vertebral arteries in the neck.    COMPARISON:  No relevant comparison studies available at the time of dictation.    FINDINGS:  Small area of plaque in the left carotid bulb.    Velocity measurements:    Right ICA peak systolic velocity: 84 cm/sec    Right ICA/CCA ratio: 1.6    Left ICA peak systolic velocity: 54 cm/sec    Left ICA/CCA ratio: 1.1    Right Vertebral artery: Antegrade flow    Left Vertebral artery: Antegrade flow  Impression: Carotid stenosis of less than 50%.    Electronically  signed by: Koko He  Date:    12/30/2022  Time:    11:57      Scheduled Med:   aspirin  325 mg Oral Daily    atorvastatin  20 mg Oral QHS    buPROPion  150 mg Oral Daily    enoxaparin  30 mg Subcutaneous Daily    lisinopriL  20 mg Oral Daily        Continuous Infusions:       PRN Meds:  acetaminophen, acetaminophen, labetalol, melatonin, ondansetron, sodium chloride 0.9%, sodium chloride 0.9%     Nutrition Status:      Assessment/Plan:    Right posterior frontal lobe nonhemorrhagic infarct may be subacute to old.   Fall  Left leg pain, weakness  Hx of HTN  HLP  Hx of dementia     Plan  Complete stroke workup  MRI brain pending -not available [facility] at [facility]  Consulted therapy teams  Consulted neurology to eval results  Xray left foot  Resume home metoprolol  Monitor Na level        All diagnosis and differential diagnosis have been reviewed; assessment and plan has been documented; I have personally reviewed the labs and test results that are presently available; I have reviewed the patients medication list; I have reviewed the consulting providers response and recommendations. I have reviewed or attempted to review medical records based upon their availability      _____________________________________________________________________            Yair Huntley MD   12/30/2022

## 2022-12-31 PROBLEM — R29.90 STROKE-LIKE SYMPTOMS: Status: ACTIVE | Noted: 2022-12-31

## 2022-12-31 PROBLEM — R29.898 LEFT LEG WEAKNESS: Status: ACTIVE | Noted: 2022-12-31

## 2022-12-31 LAB
ABS NEUT (OLG): 2.9 X10(3)/MCL (ref 2.1–9.2)
ALBUMIN SERPL-MCNC: 3.4 G/DL (ref 3.4–4.8)
ALBUMIN/GLOB SERPL: 1.1 RATIO (ref 1.1–2)
ALP SERPL-CCNC: 98 UNIT/L (ref 40–150)
ALT SERPL-CCNC: 12 UNIT/L (ref 0–55)
AST SERPL-CCNC: 30 UNIT/L (ref 5–34)
BILIRUBIN DIRECT+TOT PNL SERPL-MCNC: 0.8 MG/DL
BUN SERPL-MCNC: 15 MG/DL (ref 9.8–20.1)
CALCIUM SERPL-MCNC: 9 MG/DL (ref 8.4–10.2)
CHLORIDE SERPL-SCNC: 107 MMOL/L (ref 98–111)
CO2 SERPL-SCNC: 25 MMOL/L (ref 23–31)
CREAT SERPL-MCNC: 0.85 MG/DL (ref 0.55–1.02)
EOSINOPHIL NFR BLD MANUAL: 0.25 X10(3)/MCL (ref 0–0.9)
EOSINOPHIL NFR BLD MANUAL: 5 %
ERYTHROCYTE [DISTWIDTH] IN BLOOD BY AUTOMATED COUNT: 13.3 % (ref 11–14.5)
GFR SERPLBLD CREATININE-BSD FMLA CKD-EPI: >60 MLS/MIN/1.73/M2
GLOBULIN SER-MCNC: 3 GM/DL (ref 2.4–3.5)
GLUCOSE SERPL-MCNC: 94 MG/DL (ref 75–121)
HCT VFR BLD AUTO: 42.7 % (ref 37–47)
HGB BLD-MCNC: 13.6 GM/DL (ref 12–16)
IMM GRANULOCYTES # BLD AUTO: 0.01 X10(3)/MCL (ref 0–0.04)
IMM GRANULOCYTES NFR BLD AUTO: 0.2 %
INSTRUMENT WBC (OLG): 5 X10(3)/MCL
LYMPHOCYTES NFR BLD MANUAL: 1.25 X10(3)/MCL
LYMPHOCYTES NFR BLD MANUAL: 25 %
MCH RBC QN AUTO: 29.1 PG
MCHC RBC AUTO-ENTMCNC: 31.9 MG/DL (ref 33–36)
MCV RBC AUTO: 91.4 FL (ref 80–94)
MONOCYTES NFR BLD MANUAL: 0.6 X10(3)/MCL (ref 0.1–1.3)
MONOCYTES NFR BLD MANUAL: 12 %
NEUTROPHILS NFR BLD MANUAL: 58 %
NRBC BLD AUTO-RTO: 0 % (ref 0–1)
PLATELET # BLD AUTO: 156 X10(3)/MCL (ref 140–371)
PLATELET # BLD EST: NORMAL 10*3/UL
PMV BLD AUTO: 9.3 FL (ref 9.4–12.4)
POTASSIUM SERPL-SCNC: 4.5 MMOL/L (ref 3.5–5.1)
PROT SERPL-MCNC: 6.4 GM/DL (ref 5.8–7.6)
RBC # BLD AUTO: 4.67 X10(6)/MCL (ref 4.2–5.4)
RBC MORPH BLD: NORMAL
SODIUM SERPL-SCNC: 141 MMOL/L (ref 132–146)
TROPONIN I SERPL-MCNC: 0.04 NG/ML (ref 0–0.04)
WBC # SPEC AUTO: 5 X10(3)/MCL (ref 4.5–11.5)

## 2022-12-31 PROCEDURE — 63600175 PHARM REV CODE 636 W HCPCS: Performed by: INTERNAL MEDICINE

## 2022-12-31 PROCEDURE — 80053 COMPREHEN METABOLIC PANEL: CPT | Performed by: INTERNAL MEDICINE

## 2022-12-31 PROCEDURE — 99221 PR INITIAL HOSPITAL CARE,LEVL I: ICD-10-PCS | Mod: ,,, | Performed by: SPECIALIST

## 2022-12-31 PROCEDURE — 25000003 PHARM REV CODE 250: Performed by: INTERNAL MEDICINE

## 2022-12-31 PROCEDURE — 85027 COMPLETE CBC AUTOMATED: CPT | Performed by: INTERNAL MEDICINE

## 2022-12-31 PROCEDURE — 85025 COMPLETE CBC W/AUTO DIFF WBC: CPT | Performed by: INTERNAL MEDICINE

## 2022-12-31 PROCEDURE — 84484 ASSAY OF TROPONIN QUANT: CPT | Performed by: INTERNAL MEDICINE

## 2022-12-31 PROCEDURE — 92610 EVALUATE SWALLOWING FUNCTION: CPT

## 2022-12-31 PROCEDURE — 96372 THER/PROPH/DIAG INJ SC/IM: CPT | Performed by: INTERNAL MEDICINE

## 2022-12-31 PROCEDURE — 97163 PT EVAL HIGH COMPLEX 45 MIN: CPT

## 2022-12-31 PROCEDURE — 36415 COLL VENOUS BLD VENIPUNCTURE: CPT | Performed by: INTERNAL MEDICINE

## 2022-12-31 PROCEDURE — G0378 HOSPITAL OBSERVATION PER HR: HCPCS

## 2022-12-31 PROCEDURE — 99221 1ST HOSP IP/OBS SF/LOW 40: CPT | Mod: ,,, | Performed by: SPECIALIST

## 2022-12-31 RX ORDER — LISINOPRIL 20 MG/1
20 TABLET ORAL ONCE
Status: COMPLETED | OUTPATIENT
Start: 2022-12-31 | End: 2022-12-31

## 2022-12-31 RX ORDER — LISINOPRIL 20 MG/1
40 TABLET ORAL DAILY
Status: DISCONTINUED | OUTPATIENT
Start: 2023-01-01 | End: 2023-01-06 | Stop reason: HOSPADM

## 2022-12-31 RX ORDER — AMLODIPINE BESYLATE 5 MG/1
10 TABLET ORAL DAILY
Status: DISCONTINUED | OUTPATIENT
Start: 2023-01-01 | End: 2023-01-06 | Stop reason: HOSPADM

## 2022-12-31 RX ORDER — HYDRALAZINE HYDROCHLORIDE 20 MG/ML
10 INJECTION INTRAMUSCULAR; INTRAVENOUS EVERY 6 HOURS PRN
Status: DISCONTINUED | OUTPATIENT
Start: 2022-12-31 | End: 2023-01-06 | Stop reason: HOSPADM

## 2022-12-31 RX ORDER — HYDRALAZINE HYDROCHLORIDE 25 MG/1
25 TABLET, FILM COATED ORAL EVERY 8 HOURS
Status: DISCONTINUED | OUTPATIENT
Start: 2022-12-31 | End: 2023-01-01

## 2022-12-31 RX ADMIN — ATORVASTATIN CALCIUM 20 MG: 10 TABLET, FILM COATED ORAL at 08:12

## 2022-12-31 RX ADMIN — METOPROLOL TARTRATE 50 MG: 50 TABLET, FILM COATED ORAL at 08:12

## 2022-12-31 RX ADMIN — BUPROPION HYDROCHLORIDE 150 MG: 150 TABLET, FILM COATED, EXTENDED RELEASE ORAL at 08:12

## 2022-12-31 RX ADMIN — LISINOPRIL 20 MG: 20 TABLET ORAL at 08:12

## 2022-12-31 RX ADMIN — ASPIRIN 325 MG: 325 TABLET, COATED ORAL at 08:12

## 2022-12-31 RX ADMIN — HYDRALAZINE HYDROCHLORIDE 25 MG: 25 TABLET, FILM COATED ORAL at 08:12

## 2022-12-31 RX ADMIN — ACETAMINOPHEN 650 MG: 325 TABLET, FILM COATED ORAL at 02:12

## 2022-12-31 RX ADMIN — LISINOPRIL 20 MG: 20 TABLET ORAL at 01:12

## 2022-12-31 RX ADMIN — SODIUM CHLORIDE: 4.5 INJECTION, SOLUTION INTRAVENOUS at 12:12

## 2022-12-31 RX ADMIN — HYDRALAZINE HYDROCHLORIDE 25 MG: 25 TABLET, FILM COATED ORAL at 01:12

## 2022-12-31 RX ADMIN — ENOXAPARIN SODIUM 30 MG: 30 INJECTION SUBCUTANEOUS at 05:12

## 2022-12-31 NOTE — NURSING
Nurses Note -- 4 Eyes      12/30/2022   09:00 PM    Skin assessed during: Admit      [x] No Pressure Injuries Present    []Prevention Measures Documented      [] Yes- Altered Skin Integrity Present or Discovered   [] LDA Added if Not in Epic (Describe Wound)   [] New Altered Skin Integrity was Present on Admit and Documented in LDA   [] Wound Image Taken    Wound Care Consulted? No    Attending Nurse:  Leticia Mayorga RN     Second RN/Staff Member:  Carmela Hsu RN

## 2022-12-31 NOTE — PT/OT/SLP EVAL
Attempt x2 to see for OT eval.   1044: pt getting bed bath.   1152: /80   Nrsg notified. OT to f/u tomorrow or as appropriate.

## 2022-12-31 NOTE — PROGRESS NOTES
Neurology consult.    Reason for consult.    Worsened, left-sided weakness.  Stroke in recent weeks. Rehab in recent days. [place] like weakness was worse. Comes to this campus for additional neurological evaluation and repeat MRI.    Exam: when I rounded earlier, she was getting bathed by nurse and aide. She looked to be in no distress.    Imaging: head CT shows what appears to me to be likely chronic ischemic disease in  the right frontal parietal junction. Reports an image is reviewed.    Problems:  One. Chronic ischemic change in the right brain with possible recurrent ischemia not well elucidated on CT    Plan:  One. Await MRI.  Two. Will follow  Three. Please excuse electronic transcription errors.

## 2022-12-31 NOTE — PLAN OF CARE
Problem: Adult Inpatient Plan of Care  Goal: Plan of Care Review  Outcome: Ongoing, Progressing  Goal: Patient-Specific Goal (Individualized)  Outcome: Ongoing, Progressing  Goal: Absence of Hospital-Acquired Illness or Injury  Outcome: Ongoing, Progressing  Goal: Optimal Comfort and Wellbeing  Outcome: Ongoing, Progressing  Goal: Readiness for Transition of Care  Outcome: Ongoing, Progressing     Problem: Bariatric Environmental Safety  Goal: Safety Maintained with Care  Outcome: Ongoing, Progressing     Problem: Infection  Goal: Absence of Infection Signs and Symptoms  Outcome: Ongoing, Progressing     Problem: Adjustment to Illness (Stroke, Ischemic/Transient Ischemic Attack)  Goal: Optimal Coping  Outcome: Ongoing, Progressing     Problem: Bowel Elimination Impaired (Stroke, Ischemic/Transient Ischemic Attack)  Goal: Effective Bowel Elimination  Outcome: Ongoing, Progressing     Problem: Cerebral Tissue Perfusion (Stroke, Ischemic/Transient Ischemic Attack)  Goal: Optimal Cerebral Tissue Perfusion  Outcome: Ongoing, Progressing     Problem: Cognitive Impairment (Stroke, Ischemic/Transient Ischemic Attack)  Goal: Optimal Cognitive Function  Outcome: Ongoing, Progressing     Problem: Communication Impairment (Stroke, Ischemic/Transient Ischemic Attack)  Goal: Improved Communication Skills  Outcome: Ongoing, Progressing     Problem: Functional Ability Impaired (Stroke, Ischemic/Transient Ischemic Attack)  Goal: Optimal Functional Ability  Outcome: Ongoing, Progressing     Problem: Respiratory Compromise (Stroke, Ischemic/Transient Ischemic Attack)  Goal: Effective Oxygenation and Ventilation  Outcome: Ongoing, Progressing     Problem: Sensorimotor Impairment (Stroke, Ischemic/Transient Ischemic Attack)  Goal: Improved Sensorimotor Function  Outcome: Ongoing, Progressing     Problem: Swallowing Impairment (Stroke, Ischemic/Transient Ischemic Attack)  Goal: Optimal Eating and Swallowing without Aspiration  Outcome:  Ongoing, Progressing     Problem: Urinary Elimination Impaired (Stroke, Ischemic/Transient Ischemic Attack)  Goal: Effective Urinary Elimination  Outcome: Ongoing, Progressing     Problem: Pain Acute  Goal: Acceptable Pain Control and Functional Ability  Outcome: Ongoing, Progressing     Problem: Fall Injury Risk  Goal: Absence of Fall and Fall-Related Injury  Outcome: Ongoing, Progressing      Statement Selected

## 2022-12-31 NOTE — PROGRESS NOTES
[place] [facility] Progress Note        Chief Complaint: Inpatient Follow-up    HPI:   89-year-old female with significant history of carotid artery disease, chronic kidney disease, GERD, HTN, major depressive disorder, mild cognitive impairment/memory loss, hyperlipidemia, peripheral vascular disease presented to outlying facility with complaints of altered mental status, left-sided weakness and inability to ambulate.  Patient had a recent fall.  Patient lives in an assisted living and at baseline she was able to ambulate with walker . she also reported left hip pain which was mild.  CT head revealed right frontal nonhemorrhagic infarct-can not rule out whether subacute or old.  Patient was transferred to our hospital for Neurology Services.  Stroke protocol initiated.  Interval Hx:     Patient seen at bedside.  Comfortably laying in bed.  Still complaining of mild left-sided weakness.  Otherwise no specific complaints.  Blood pressure is significantly accelerated.  Otherwise hemodynamics are stable.  Objective/physical exam:  General: In no acute distress, afebrile  Chest: Clear to auscultation bilaterally  Heart: S1, S2, no appreciable murmur  Abdomen: Soft, nontender, BS +  MSK: Warm, no lower extremity edema, no clubbing or cyanosis  Neurologic: Alert and oriented x4, left-sided weakness    VITAL SIGNS: 24 HRS MIN & MAX LAST   Temp  Min: 97.4 °F (36.3 °C)  Max: 97.5 °F (36.4 °C) 97.5 °F (36.4 °C)   BP  Min: 161/73  Max: 201/77 (!) 161/73     Pulse  Min: 54  Max: 73  (!) 54     Resp  Min: 18  Max: 18 18   SpO2  Min: 93 %  Max: 99 % (!) 93 %         Recent Labs   Lab 12/30/22  0620   WBC 4.4*   RBC 4.26   HGB 12.8   HCT 39.2   MCV 92.0   MCH 30.0   MCHC 32.7*   RDW 13.3   *   MPV 9.5         Recent Labs   Lab 12/30/22  0620   *   K 3.5   CO2 30   BUN 24.5*   CREATININE 1.01   CALCIUM 9.0   MG 1.80   ALBUMIN 3.4   ALKPHOS 85   ALT 11   AST 17   BILITOT 0.7           Microbiology Results (last 7 days)       ** No results found for the last 168 hours. **             Scheduled Med:   aspirin  325 mg Oral Daily    atorvastatin  20 mg Oral QHS    buPROPion  150 mg Oral Daily    enoxaparin  30 mg Subcutaneous Daily    lisinopriL  20 mg Oral Daily    metoprolol tartrate  50 mg Oral BID          Assessment/Plan:    Suspected acute ischemic CVA   Left-sided weakness-new   Right frontal nonhemorrhagic infarct-likely old  Poorly-controlled HTN/hypertensive urgency  History of carotid artery disease   Mild cognitive impairment/memory loss  HLD   Major depressive disorder   History of GERD  HTN   History of PVD   Prophylaxis    Patient reports left-sided weakness is new   Full-dose aspirin, high-intensity statin  Per Neurology CT findings are old   Await MRI   Await ultrasound carotid, echocardiogram with bubble  Troponins was slightly elevated on admit, most likely type 2 demand ischemia from severely elevated blood pressure   Will repeat   Follow-up echocardiogram  PT/OT consulted   ST cleared her for modified diet   Off permissive window  Resumed amlodipine, metoprolol tartrate, will have to be cautious given bradycardia   Added lisinopril, hydralazine   Continue to modify as needed   Continue other home meds-bupropion  DVT prophylaxis-Pb Fagan MD   12/31/2022

## 2022-12-31 NOTE — PT/OT/SLP EVAL
Physical Therapy Evaluation    Patient Name:  Soila Buck   MRN:  [id number]    Recommendations:     Discharge Recommendations: nursing facility, skilled   Discharge Equipment Recommendations: other (see comments) (pending progress)     Assessment:     Soila Buck is a 89 y.o. female admitted with a medical diagnosis of CVA (cerebral vascular accident), L sided weakness, AMS, and fall.  She presents with the following impairments/functional limitations: weakness, impaired endurance, impaired self care skills, impaired functional mobility, gait instability, impaired balance, impaired cognition, decreased coordination, decreased upper extremity function, decreased lower extremity function, decreased safety awareness, pain, impaired coordination, impaired fine motor.    Hx: CKD, HTN, depression, mild cognitive impairment with memory loss, PVD, dementia    Rehab Prognosis: Fair; patient would benefit from acute skilled PT services to address these deficits and reach maximum level of function.    Recent Surgery: * No surgery found *      Plan:     During this hospitalization, patient to be seen daily to address the identified rehab impairments via gait training, therapeutic activities, therapeutic exercises, neuromuscular re-education and progress toward the following goals:    Plan of Care Expires:  01/30/23    Subjective     Chief Complaint: LLE pain   Patient/Family Comments/goals: To improve independence with all mobility   Pain/Comfort:  Pain Rating 1: 4/10  Location - Side 1: Bilateral  Location 1: leg  Pain Addressed 1: Cessation of Activity, Reposition      Living Environment:  Pt is oriented to self only and is a poor historian. However, pt able to report that she lives in a NH prior to admit.   Pt is poor historian and unable to provide PLOF. Pt reports that she thinks she could walk prior, but is not sure.  Upon discharge, patient will have assistance from nursing home staff.    Objective:     Communicated  with nurse Davis prior to session and given ok to work with pt.  Patient found HOB elevated with telemetry, peripheral IV, PureWick  upon PT entry to room.    General Precautions: Standard, fall  Orthopedic Precautions:    Braces:    Respiratory Status: Room air    Exams:  RLE Strength: very slightly better than LLE but still limited to 2-/5 all  LLE Strength: limited to 2-/5 all     Functional Mobility:  Bed Mobility:     Supine to Sit: total assistance  Sit to Supine: total assistance  Balance: Pt sitting EOB with CGA/SBA for balance for approx. 5 min.           Patient left HOB elevated with all lines intact, call button in reach, and bed alarm on.    GOALS:   Multidisciplinary Problems       Physical Therapy Goals          Problem: Physical Therapy    Goal Priority Disciplines Outcome Goal Variances Interventions   Physical Therapy Goal     PT, PT/OT Ongoing, Progressing     Description: STGs:    1. Pt to perform bed mobility with Mod A  2. Pt to remain static/dynamic sitting unsupported with supervision for 15 minutes  3. Pt to perform sit<>stand with use of appropriate AD and Mod A.    Progress goals as needed.                       History:     Past Medical History:   Diagnosis Date    Carotid artery syndrome     CKD (chronic kidney disease) stage 3, GFR 30-59 ml/min     GERD (gastroesophageal reflux disease)     Hypertension     Major depressive disorder, single episode, unspecified     Mild cognitive impairment with memory loss     Mixed hyperlipidemia     PVD (peripheral vascular disease)        History reviewed. No pertinent surgical history.    Time Tracking:     PT Received On:    PT Start Time: 1207     PT Stop Time: 1229  PT Total Time (min): 22 min     Billable Minutes: Evaluation high complexity 22 minutes       12/31/2022

## 2022-12-31 NOTE — PLAN OF CARE
Problem: Physical Therapy  Goal: Physical Therapy Goal  Description: STGs:    1. Pt to perform bed mobility with Mod A  2. Pt to remain static/dynamic sitting unsupported with supervision for 15 minutes  3. Pt to perform sit<>stand with use of appropriate AD and Mod A.    Progress goals as needed.  Outcome: Ongoing, Progressing

## 2022-12-31 NOTE — PT/OT/SLP EVAL
"Speech Language Pathology Department  Clinical Swallow Evaluation    Patient Name:  Soila Buck   MRN:  [id number]  Admitting Diagnosis: CVA (cerebral vascular accident)    Recommendations:     General recommendations:  SLP follow up x1 and dysphagia therapy  Diet recommendations:  Puree Diet - IDDSI Level 4, Liquid Diet Level: Thin liquids - IDDSI Level 0   Swallow strategies/precautions: small bites/sips, NO straws, Supervision with meals due to attention deficits, Assist with feeding as needed, and medications per patient preference  Precautions: Standard, aspiration    History:     Past Medical History:   Diagnosis Date    Carotid artery syndrome     CKD (chronic kidney disease) stage 3, GFR 30-59 ml/min     GERD (gastroesophageal reflux disease)     Hypertension     Major depressive disorder, single episode, unspecified     Mild cognitive impairment with memory loss     Mixed hyperlipidemia     PVD (peripheral vascular disease)        History reviewed. No pertinent surgical history.      Home Diet: Regular and thin liquids  Current Method of Nutrition: PO diet all liquid diet    Patient complaint: "eat"    Subjective     Patient cooperative and distracted.    Patient goals: "To get home"    Spiritual/Cultural/Taoist Beliefs/Practices that affect care: no    Pain/Comfort: Pain Rating 1: 0/10    Respiratory Status: Room air     Objective:     Oral Musculature Evaluation  Oral Musculature: WFL  Dentition: present and adequate  Secretion Management: adequate  Mucosal Quality: good  Mandibular Strength and Mobility: WNL  Oral Labial Strength and Mobility: WFL  Lingual Strength and Mobility: WFL    Consistency Fed By Oral Symptoms Pharyngeal Symptoms   Thin liquid by cup Self None None   Puree SLP None None   Chewable solid Self Prolonged mastication  Slowed oral transit time None     Assessment:     CVA protocol orders received, chart reviewed, and nursing consulted. Pt with prolonged mastication and slowed oral " transit time with chewable solids. Pt also required verbal cues to continue to take additional bites during bedside swallow assessment. Pt's cognitive status negatively impacts tolerance of advanced diet textures.     Goals:   Multidisciplinary Problems       SLP Goals          Problem: SLP    Goal Priority Disciplines Outcome   SLP Goal     SLP    Description: Long Term Goals:  1. Pt will tolerate least restrictive diet with no clinical signs/sx of aspiration       Short Term Goals:  1. Pt will tolerate half meal of minced and moist with improved bolus propulsion/transport  2. Pt will complete TTS with 100% swallow attempts and delay less than 2 seconds     Goals created and POC initiated                      Patient Education:     Patient provided with verbal education regarding POC.  Understanding was verbalized, however additional teaching warranted.    Plan:     SLP Follow-Up:  Yes   Patient to be seen:  5 x/week   Plan of Care expires:  01/07/23  Plan of Care reviewed with:  patient      Time Tracking:     SLP Treatment Date:   12/31/22  Speech Start Time:   1100  Speech Stop Time:      1115  Speech Total Time (min):   15 ,minutes     Billable minutes:  Swallow and Oral Function Evaluation, 15 minutes       12/31/2022

## 2023-01-01 LAB
ALBUMIN SERPL-MCNC: 3.4 G/DL (ref 3.4–4.8)
ALBUMIN/GLOB SERPL: 1.4 RATIO (ref 1.1–2)
ALP SERPL-CCNC: 98 UNIT/L (ref 40–150)
ALT SERPL-CCNC: 15 UNIT/L (ref 0–55)
AST SERPL-CCNC: 30 UNIT/L (ref 5–34)
AV INDEX (PROSTH): 0.6
AV MEAN GRADIENT: 6 MMHG
AV PEAK GRADIENT: 11 MMHG
AV REGURGITATION PRESSURE HALF TIME: 563 MS
AV VALVE AREA: 1.88 CM2
AV VELOCITY RATIO: 0.56
BASOPHILS # BLD AUTO: 0.02 X10(3)/MCL (ref 0–0.2)
BASOPHILS NFR BLD AUTO: 0.4 %
BILIRUBIN DIRECT+TOT PNL SERPL-MCNC: 0.9 MG/DL
BSA FOR ECHO PROCEDURE: 2.11 M2
BUN SERPL-MCNC: 14.3 MG/DL (ref 9.8–20.1)
CALCIUM SERPL-MCNC: 9.1 MG/DL (ref 8.4–10.2)
CHLORIDE SERPL-SCNC: 105 MMOL/L (ref 98–111)
CO2 SERPL-SCNC: 27 MMOL/L (ref 23–31)
CREAT SERPL-MCNC: 0.86 MG/DL (ref 0.55–1.02)
CV ECHO LV RWT: 0.58 CM
DOP CALC AO PEAK VEL: 1.69 M/S
DOP CALC AO VTI: 38 CM
DOP CALC LVOT AREA: 3.1 CM2
DOP CALC LVOT DIAMETER: 2 CM
DOP CALC LVOT PEAK VEL: 0.95 M/S
DOP CALC LVOT STROKE VOLUME: 71.28 CM3
DOP CALC MV VTI: 42 CM
DOP CALCLVOT PEAK VEL VTI: 22.7 CM
E WAVE DECELERATION TIME: 214 MSEC
E/A RATIO: 1.16
E/E' RATIO: 15.43 M/S
ECHO LV POSTERIOR WALL: 1.29 CM (ref 0.6–1.1)
EJECTION FRACTION: 60 %
EOSINOPHIL # BLD AUTO: 0.12 X10(3)/MCL (ref 0–0.9)
EOSINOPHIL NFR BLD AUTO: 2.5 %
ERYTHROCYTE [DISTWIDTH] IN BLOOD BY AUTOMATED COUNT: 13.4 % (ref 11–14.5)
FRACTIONAL SHORTENING: 33 % (ref 28–44)
GFR SERPLBLD CREATININE-BSD FMLA CKD-EPI: >60 MLS/MIN/1.73/M2
GLOBULIN SER-MCNC: 2.5 GM/DL (ref 2.4–3.5)
GLUCOSE SERPL-MCNC: 95 MG/DL (ref 75–121)
HCT VFR BLD AUTO: 41.3 % (ref 37–47)
HGB BLD-MCNC: 13.8 GM/DL (ref 12–16)
IMM GRANULOCYTES # BLD AUTO: 0.01 X10(3)/MCL (ref 0–0.04)
IMM GRANULOCYTES NFR BLD AUTO: 0.2 %
INTERVENTRICULAR SEPTUM: 1.29 CM (ref 0.6–1.1)
LEFT ATRIUM SIZE: 4.4 CM
LEFT INTERNAL DIMENSION IN SYSTOLE: 3.01 CM (ref 2.1–4)
LEFT VENTRICLE DIASTOLIC VOLUME INDEX: 45.02 ML/M2
LEFT VENTRICLE DIASTOLIC VOLUME: 90.5 ML
LEFT VENTRICLE MASS INDEX: 108 G/M2
LEFT VENTRICLE SYSTOLIC VOLUME INDEX: 17.6 ML/M2
LEFT VENTRICLE SYSTOLIC VOLUME: 35.3 ML
LEFT VENTRICULAR INTERNAL DIMENSION IN DIASTOLE: 4.46 CM (ref 3.5–6)
LEFT VENTRICULAR MASS: 217.08 G
LV LATERAL E/E' RATIO: 15.43 M/S
LV SEPTAL E/E' RATIO: 15.43 M/S
LVOT MG: 2 MMHG
LVOT MV: 0.61 CM/S
LYMPHOCYTES # BLD AUTO: 1.34 X10(3)/MCL (ref 0.6–4.6)
LYMPHOCYTES NFR BLD AUTO: 27.5 %
MCH RBC QN AUTO: 29.4 PG
MCHC RBC AUTO-ENTMCNC: 33.4 MG/DL (ref 33–36)
MCV RBC AUTO: 88.1 FL (ref 80–94)
MONOCYTES # BLD AUTO: 0.59 X10(3)/MCL (ref 0.1–1.3)
MONOCYTES NFR BLD AUTO: 12.1 %
MV MEAN GRADIENT: 2 MMHG
MV PEAK A VEL: 0.93 M/S
MV PEAK E VEL: 1.08 M/S
MV PEAK GRADIENT: 6 MMHG
MV STENOSIS PRESSURE HALF TIME: 58 MS
MV VALVE AREA BY CONTINUITY EQUATION: 1.7 CM2
MV VALVE AREA P 1/2 METHOD: 3.79 CM2
NEUTROPHILS # BLD AUTO: 2.8 X10(3)/MCL (ref 2.1–9.2)
NEUTROPHILS NFR BLD AUTO: 57.3 %
NRBC BLD AUTO-RTO: 0 % (ref 0–1)
PISA AR MAX VEL: 4.12 M/S
PLATELET # BLD AUTO: 169 X10(3)/MCL (ref 140–371)
PMV BLD AUTO: 10.1 FL (ref 9.4–12.4)
POTASSIUM SERPL-SCNC: 3.7 MMOL/L (ref 3.5–5.1)
PROT SERPL-MCNC: 5.9 GM/DL (ref 5.8–7.6)
RA PRESSURE: 3 MMHG
RBC # BLD AUTO: 4.69 X10(6)/MCL (ref 4.2–5.4)
SINUS: 3.9 CM
SODIUM SERPL-SCNC: 143 MMOL/L (ref 132–146)
TDI LATERAL: 0.07 M/S
TDI SEPTAL: 0.07 M/S
TDI: 0.07 M/S
TRICUSPID ANNULAR PLANE SYSTOLIC EXCURSION: 1.9 CM
WBC # SPEC AUTO: 4.9 X10(3)/MCL (ref 4.5–11.5)

## 2023-01-01 PROCEDURE — 99232 PR SUBSEQUENT HOSPITAL CARE,LEVL II: ICD-10-PCS | Mod: ,,, | Performed by: SPECIALIST

## 2023-01-01 PROCEDURE — 96372 THER/PROPH/DIAG INJ SC/IM: CPT | Performed by: INTERNAL MEDICINE

## 2023-01-01 PROCEDURE — 97166 OT EVAL MOD COMPLEX 45 MIN: CPT

## 2023-01-01 PROCEDURE — 63600175 PHARM REV CODE 636 W HCPCS: Performed by: INTERNAL MEDICINE

## 2023-01-01 PROCEDURE — 25000003 PHARM REV CODE 250: Performed by: INTERNAL MEDICINE

## 2023-01-01 PROCEDURE — G0378 HOSPITAL OBSERVATION PER HR: HCPCS

## 2023-01-01 PROCEDURE — 36415 COLL VENOUS BLD VENIPUNCTURE: CPT | Performed by: INTERNAL MEDICINE

## 2023-01-01 PROCEDURE — 80053 COMPREHEN METABOLIC PANEL: CPT | Performed by: INTERNAL MEDICINE

## 2023-01-01 PROCEDURE — 99232 SBSQ HOSP IP/OBS MODERATE 35: CPT | Mod: ,,, | Performed by: SPECIALIST

## 2023-01-01 PROCEDURE — 85025 COMPLETE CBC W/AUTO DIFF WBC: CPT | Performed by: INTERNAL MEDICINE

## 2023-01-01 RX ORDER — HYDRALAZINE HYDROCHLORIDE 50 MG/1
50 TABLET, FILM COATED ORAL EVERY 8 HOURS
Status: DISCONTINUED | OUTPATIENT
Start: 2023-01-01 | End: 2023-01-01

## 2023-01-01 RX ADMIN — HYDRALAZINE HYDROCHLORIDE 25 MG: 25 TABLET, FILM COATED ORAL at 05:01

## 2023-01-01 RX ADMIN — METOPROLOL TARTRATE 50 MG: 50 TABLET, FILM COATED ORAL at 08:01

## 2023-01-01 RX ADMIN — HYDRALAZINE HYDROCHLORIDE 50 MG: 50 TABLET, FILM COATED ORAL at 10:01

## 2023-01-01 RX ADMIN — LISINOPRIL 40 MG: 20 TABLET ORAL at 08:01

## 2023-01-01 RX ADMIN — AMLODIPINE BESYLATE 10 MG: 5 TABLET ORAL at 08:01

## 2023-01-01 RX ADMIN — ENOXAPARIN SODIUM 30 MG: 30 INJECTION SUBCUTANEOUS at 04:01

## 2023-01-01 RX ADMIN — HYDRALAZINE HYDROCHLORIDE 75 MG: 50 TABLET, FILM COATED ORAL at 06:01

## 2023-01-01 RX ADMIN — ATORVASTATIN CALCIUM 20 MG: 10 TABLET, FILM COATED ORAL at 08:01

## 2023-01-01 RX ADMIN — BUPROPION HYDROCHLORIDE 150 MG: 150 TABLET, FILM COATED, EXTENDED RELEASE ORAL at 08:01

## 2023-01-01 RX ADMIN — ASPIRIN 325 MG: 325 TABLET, COATED ORAL at 08:01

## 2023-01-01 NOTE — PROGRESS NOTES
"Inpatient Nutrition Evaluation    Admit Date: 12/29/2022   Total duration of encounter: 3 days    Nutrition Recommendation/Prescription     - continue diet per SLP recs  - boost plus provides 360kcal, 14 gm protein per container    Nutrition Assessment     Chart Review    Reason Seen: malnutrition screening tool (MST)    Malnutrition Screening Tool Results   Have you recently lost weight without trying?: Unsure  Have you been eating poorly because of a decreased appetite?: No   MST Score: 2     Diagnosis:  Suspected acute ischemic CVA   Left-sided weakness-? new  Right frontal nonhemorrhagic infarct-likely old  Poorly-controlled HTN/hypertensive urgency  History of carotid artery disease   Mild cognitive impairment/memory loss  HLD   Major depressive disorder     Relevant Medical History:  carotid artery disease, chronic kidney disease, GERD, HTN, major depressive disorder, mild cognitive impairment/memory loss, hyperlipidemia, peripheral vascular disease     Nutrition-Related Medications: none noted    Nutrition-Related Labs:  12/31: BMP wnl    Diet Order: Diet Pureed  Oral Supplement Order: Boost Plus  Appetite/Oral Intake: good/50-75% of meals  Factors Affecting Nutritional Intake: none identified  Food/Denominational/Cultural Preferences: none reported  Food Allergies: none reported       Wound(s):   none noted    Comments    1/1: pt confused and difficult to get any history; nurse in room reports she ate all of her breakfast this morning with assistance, tolerating oral diet    Anthropometrics    Height: 5' 2.01" (157.5 cm) Height Method: Estimated  Last Weight: 102.1 kg (225 lb) (12/31/22 0728) Weight Method: Bed Scale  BMI (Calculated): 41.1  BMI Classification: obese grade III (BMI >/=40)     Ideal Body Weight (IBW), Female: 110.05 lb     % Ideal Body Weight, Female (lb): 204.45 %                             Usual Weight Provided By: unable to obtain usual weight    Wt Readings from Last 3 Encounters:   12/31/22 " 0728 102.1 kg (225 lb)   12/29/22 1135 102.1 kg (225 lb)      Weight Change(s) Since Admission:  Admit Weight: 102.1 kg (225 lb) (12/29/22 1135)      Patient Education    Not applicable.    Monitoring & Evaluation     Dietitian will monitor food and beverage intake.  Nutrition Risk/Follow-Up: low (follow-up in 5-7 days)  Patients assigned 'low nutrition risk' status do not qualify for a full nutritional assessment but will be monitored and re-evaluated in a 5-7 day time period. Please consult if re-evaluation needed sooner.

## 2023-01-01 NOTE — PLAN OF CARE
Problem: Adult Inpatient Plan of Care  Goal: Plan of Care Review  Outcome: Ongoing, Progressing  Goal: Patient-Specific Goal (Individualized)  Outcome: Ongoing, Progressing  Goal: Absence of Hospital-Acquired Illness or Injury  Outcome: Ongoing, Progressing  Goal: Optimal Comfort and Wellbeing  Outcome: Ongoing, Progressing  Goal: Readiness for Transition of Care  Outcome: Ongoing, Progressing     Problem: Bariatric Environmental Safety  Goal: Safety Maintained with Care  Outcome: Ongoing, Progressing     Problem: Infection  Goal: Absence of Infection Signs and Symptoms  Outcome: Ongoing, Progressing     Problem: Adjustment to Illness (Stroke, Ischemic/Transient Ischemic Attack)  Goal: Optimal Coping  Outcome: Ongoing, Progressing     Problem: Bowel Elimination Impaired (Stroke, Ischemic/Transient Ischemic Attack)  Goal: Effective Bowel Elimination  Outcome: Ongoing, Progressing     Problem: Cerebral Tissue Perfusion (Stroke, Ischemic/Transient Ischemic Attack)  Goal: Optimal Cerebral Tissue Perfusion  Outcome: Ongoing, Progressing     Problem: Cognitive Impairment (Stroke, Ischemic/Transient Ischemic Attack)  Goal: Optimal Cognitive Function  Outcome: Ongoing, Progressing     Problem: Communication Impairment (Stroke, Ischemic/Transient Ischemic Attack)  Goal: Improved Communication Skills  Outcome: Ongoing, Progressing     Problem: Functional Ability Impaired (Stroke, Ischemic/Transient Ischemic Attack)  Goal: Optimal Functional Ability  Outcome: Ongoing, Progressing     Problem: Respiratory Compromise (Stroke, Ischemic/Transient Ischemic Attack)  Goal: Effective Oxygenation and Ventilation  Outcome: Ongoing, Progressing     Problem: Sensorimotor Impairment (Stroke, Ischemic/Transient Ischemic Attack)  Goal: Improved Sensorimotor Function  Outcome: Ongoing, Progressing     Problem: Swallowing Impairment (Stroke, Ischemic/Transient Ischemic Attack)  Goal: Optimal Eating and Swallowing without Aspiration  Outcome:  Ongoing, Progressing     Problem: Urinary Elimination Impaired (Stroke, Ischemic/Transient Ischemic Attack)  Goal: Effective Urinary Elimination  Outcome: Ongoing, Progressing     Problem: Pain Acute  Goal: Acceptable Pain Control and Functional Ability  Outcome: Ongoing, Progressing     Problem: Fall Injury Risk  Goal: Absence of Fall and Fall-Related Injury  Outcome: Ongoing, Progressing

## 2023-01-01 NOTE — PT/OT/SLP EVAL
Occupational Therapy   Evaluation    Name: Soila Buck  MRN: [id number]  Admitting Diagnosis: CVA (cerebral vascular accident)  Recent Surgery: * No surgery found *      Recommendations:     Discharge Recommendations: nursing facility, skilled  Discharge Equipment Recommendations:  other (see comments) (Pending progress and disposition)  Barriers to discharge:  Other (Comment) (Severity of functional deficits)    Assessment:     Soila Buck is a 89 y.o. female with medical diagnoses of CVA (cerebral vascular accident), generalized weakness, and fall. She has a significant PMHx of dementia. She presents with the following performance deficits affecting function: weakness, impaired endurance, impaired self care skills, impaired functional mobility, impaired balance, impaired cognition, decreased safety awareness, decreased upper extremity function, decreased lower extremity function, decreased coordination, impaired fine motor.   Pt's participation in today's evaluation was significantly limited by impaired cognition, impaired functional mobility, as well as decreased upper extremity function and generalized weakness. She requires Total A for bed mobility and was unable to complete a sit>stand transfer. Her BUE presentation varied throughout evaluation, with greater strength and active range of motion observed during ADLs compared to formal MMT assessment. Overall, she has generalized weakness with the LUE mildly weaker than the RUE. As of now, pt would benefit from SNF placement upon d/c to maximize her functional independence and safety.    Rehab Prognosis: Fair; patient would benefit from acute skilled OT services to address these deficits and reach maximum level of function.       Plan:     Patient to be seen 5 x/week, 6 x/week to address the above listed problems via self-care/home management, therapeutic activities, therapeutic exercises, neuromuscular re-education  Plan of Care Expires: 01/23/23  Plan of Care  "Reviewed with: patient    Subjective     Chief Complaint: L hip and shoulder discomfort  Patient/Family Comments/goals: None verbalized    Occupational Profile: *Limited d/t pt being a poor historian*  Living Environment: Per chart, pt resides in a nursing home at baseline.  Previous level of function: Unknown at this time. When asked whether she ambulated with a walker, pt responded "I think so."  Roles and Routines: Unknown at this time.  Equipment Used at Home: other (see comments) (Uknown at this time d/t pt being poor historian)  Assistance upon Discharge: Nursing home staff.    Pain/Comfort:  Pain Rating 1: other (see comments) (Pt did not rate her pain, but complained of L hip discomfort)  Location - Side 1: Left  Location 1: hip  Pain Addressed 1: Reposition, Distraction, Nurse notified  Pain Rating Post-Intervention 1: other (see comments) (Complaints of discomfort minimized)    Patients cultural, spiritual, Orthodoxy conflicts given the current situation: no    Objective:     Communicated with: RN prior to session.  Patient found HOB elevated with bed alarm, pulse ox (continuous), telemetry upon OT entry to room.    General Precautions: Standard, fall, other (see comments)  Orthopedic Precautions: N/A  Braces: N/A  Respiratory Status: Room air    Vitals:  Pre-Activity BP: 182/81 (RN notified, and cleared pt for mobility)  Post-Activity BP: 186/82    Occupational Performance:    Bed Mobility:    Patient completed Supine to Sit with total assistance  Patient completed Sit to Supine with total assistance    Functional Mobility/Transfers:  Functional Mobility: Attempted sit>stand transfer, however pt was unable to successfully complete despite Max A x 2 and RW.     Activities of Daily Living:  Feeding:  minimum assistance (hand-over-hand) for hand-to-mouth excursion while grasping water bottle in order to swallow medicine provided by RN with water. Pt requiring additional verbal cueing to attend to, and " follow through with, task. Moderate hand-over-hand assistance required to grasp spoon, did not successfully complete hand-to-mouth excursion d/t not wanting to eat at this time. Despite this amount of assist required during today's evaluation, pt's breakfast tray was noted to be clear and RN reports she is not an assist-feed.  Grooming: minimum assistance (hand-over-hand) to wash face with wet washcloth while seated EOB.    Cognitive/Visual Perceptual:  Cognitive/Psychosocial Skills:     -       Oriented to: Person and Place, not oriented to time   -       Follows Commands/attention:Follows one step and two step commands inconsistently.  -       Memory: Impaired LTM  -       Safety awareness/insight to disability: impaired   -       Mood/Affect/Coping skills/emotional control: Lethargic and Withdrawn  Visual/Perceptual:      -Intact  tracking, R/L discrimination, and visual field .    Physical Exam:  Balance:    -       Sitting balance mildly impaired, requiring at least SBA. Unable to assess standing balance d/t functional mobility deficits.  Upper Extremity Range of Motion:     -       Right Upper Extremity: PROM WNL; Shoulder AROM significant impaired, elbow AROM moderately impaired, wrist and hand AROM moderately impaired.  -       Left Upper Extremity: PROM WNL; Shoulder AROM significant impaired, elbow AROM moderately impaired, wrist and hand AROM moderately impaired  Upper Extremity Strength:    -       Right Upper Extremity: 2+/5 grossly  -       Left Upper Extremity: 2+/5 grossly  Fine Motor Coordination:    -       Impaired  Left hand, manipulation of objects   and Right hand, manipulation of objects      Treatment & Education:  Educated pt on the role of OT and her POC. She demonstrated fair understanding.    Patient left HOB elevated with all lines intact, call button in reach, bed alarm on, and RN notified    GOALS:   Multidisciplinary Problems       Occupational Therapy Goals          Problem:  Occupational Therapy    Goal Priority Disciplines Outcome Interventions   Occupational Therapy Goal     OT, PT/OT Ongoing, Progressing    Description: Goals to be met by: 1/23/23     Patient will increase functional independence with ADLs by performing:    Feeding with Set-up Assistance.  Grooming while EOB with Set-up Assistance.  Toileting from bedside commode with Moderate Assistance for hygiene and clothing management.   Supine to sit with Moderate Assistance.  Toilet transfer to bedside commode with Moderate Assistance.    Pt will increase functional strength to 3+/5 for improved participation in self-care tasks.                         History:     Past Medical History:   Diagnosis Date    Carotid artery syndrome     CKD (chronic kidney disease) stage 3, GFR 30-59 ml/min     GERD (gastroesophageal reflux disease)     Hypertension     Major depressive disorder, single episode, unspecified     Mild cognitive impairment with memory loss     Mixed hyperlipidemia     PVD (peripheral vascular disease)        History reviewed. No pertinent surgical history.    Time Tracking:     OT Date of Treatment: 01/01/23  OT Start Time: 1035  OT Stop Time: 1107  OT Total Time (min): 32 min    Billable Minutes:Evaluation 32 min    1/1/2023

## 2023-01-01 NOTE — PLAN OF CARE
Problem: Occupational Therapy  Goal: Occupational Therapy Goal  Description: Goals to be met by: 1/23/23     Patient will increase functional independence with ADLs by performing:    Feeding with Set-up Assistance.  Grooming while EOB with Set-up Assistance.  Toileting from bedside commode with Moderate Assistance for hygiene and clothing management.   Supine to sit with Moderate Assistance.  Toilet transfer to bedside commode with Moderate Assistance.    Pt will increase functional strength to 3+/5 for improved participation in self-care tasks.    Outcome: Ongoing, Progressing

## 2023-01-01 NOTE — PROGRESS NOTES
[facility] Hospital Medicine Progress Note        Chief Complaint: Inpatient Follow-up    HPI:   89-year-old female with significant history of carotid artery disease, chronic kidney disease, GERD, HTN, major depressive disorder, mild cognitive impairment/memory loss, hyperlipidemia, peripheral vascular disease presented to outlying facility with complaints of altered mental status, left-sided weakness and inability to ambulate.  Patient had a recent fall.  Patient lives in an assisted living and at baseline she was able to ambulate with walker . she also reported left hip pain which was mild.  CT head revealed right frontal nonhemorrhagic infarct-can not rule out whether subacute or old.  Patient was transferred to our hospital for Neurology Services.  Stroke protocol initiated. Per Neurology CT findings could be old, awaiting MRI.  Blood pressure is significantly accelerated. Patient is off  permissive window as of 12/31, modified antihypertensives to attend goal BP.  Patient cleared for p.o. intake by speech therapy.  Interval Hx:     Patient seen at bedside.    She is awake, alert, oriented x2 at the time of my rounds.  No new focal deficits except for the left-sided weakness which she is not sure whether it is new or old.      Objective/physical exam:  General: In no acute distress, afebrile  Chest: Clear to auscultation bilaterally  Heart: S1, S2, no appreciable murmur  Abdomen: Soft, nontender, BS +  MSK: Warm, no lower extremity edema, no clubbing or cyanosis  Neurologic: Alert and oriented x4, left-sided weakness    VITAL SIGNS: 24 HRS MIN & MAX LAST   Temp  Min: 97.2 °F (36.2 °C)  Max: 98.2 °F (36.8 °C) 97.7 °F (36.5 °C)   BP  Min: 182/73  Max: 193/83 (!) 185/85     Pulse  Min: 50  Max: 74  74     Resp  Min: 18  Max: 18 18   SpO2  Min: 91 %  Max: 97 % 97 %         Recent Labs   Lab 12/31/22  1339   WBC 5.0   RBC 4.67   HGB 13.6   HCT 42.7   MCV 91.4   MCH 29.1   MCHC 31.9*    RDW 13.3      MPV 9.3*         Recent Labs   Lab 12/30/22  0620 12/31/22  1339   * 141   K 3.5 4.5   CO2 30 25   BUN 24.5* 15.0   CREATININE 1.01 0.85   CALCIUM 9.0 9.0   MG 1.80  --    ALBUMIN 3.4 3.4   ALKPHOS 85 98   ALT 11 12   AST 17 30   BILITOT 0.7 0.8          Microbiology Results (last 7 days)       ** No results found for the last 168 hours. **             Scheduled Med:   amLODIPine  10 mg Oral Daily    aspirin  325 mg Oral Daily    atorvastatin  20 mg Oral QHS    buPROPion  150 mg Oral Daily    enoxaparin  30 mg Subcutaneous Daily    hydrALAZINE  50 mg Oral Q8H    lisinopriL  40 mg Oral Daily    metoprolol tartrate  50 mg Oral BID          Assessment/Plan:    Suspected acute ischemic CVA   Left-sided weakness-? new  Right frontal nonhemorrhagic infarct-likely old  Poorly-controlled HTN/hypertensive urgency  History of carotid artery disease   Mild cognitive impairment/memory loss  HLD   Major depressive disorder   History of GERD  HTN   History of PVD    Valvular heart disease  Prophylaxis    Patient is in fact unsure whether her left-sided weakness is old or new  CT had shown a old right frontal infarct  Follow-up MRI brain to rule out new CVA  Echocardiogram with negative bubble study, outpatient follow-up for valvular heart disease with Cardiology  Ultrasound carotid with less than 50% stenosis bilaterally  Full-dose aspirin, high-intensity statin  Troponins was slightly elevated on admit, most likely type 2 demand ischemia from severely elevated blood pressure   Trended down to normal   EF is normal  PT/OT consulted   ST cleared her for modified diet  as of 12/31  Off permissive window   BP still accelerated on amlodipine, metoprolol tartrate, max dose lisinopril    Added hydralazine, dose increased to 75 mg t.I.d. today  Continue to modify as needed   Continue other home meds-bupropion  DVT prophylaxis-Lovenox     Await MRI brain, await PT/ OT recommendations, DC disposition depending  up on PT/ OT recommendation   Patient is from assisted living      Molly Fagan MD   01/01/2023

## 2023-01-01 NOTE — PROGRESS NOTES
Patient tolerating current PO diet well per nursing. ST to continue to follow for dysphagia therapy and cognitive-linguistic evaluation.

## 2023-01-01 NOTE — PROGRESS NOTES
Stroke in recent weeks. Rehab in recent days. [place] like weakness was worse. Comes to this campus for additional neurological evaluation and repeat MRI, which was expected yest but has not yet occurred.     Exam:   /85   Pulse 74   Temp 97.7 °F (36.5 °C)  Resp 18    Wt 102.1 kg SpO2 97%   BMI 41.14 kg/m²     no distress.  Seen w nursing   Cannot give any hx   Doesn't know if went down to mri and was aborted or not   Nurse unaware either     Imaging: head CT shows what appears to me to be likely chronic ischemic disease in  the right frontal parietal junction. Reports and images were reviewed yesterday.    Problems:  Chronic ischemic change in the right brain with possible recurrent ischemia not well elucidated on CT    Plan:  1. Await MRI.  2. Will ask Dr LIU to assume neurologist attention tomorrow

## 2023-01-02 PROCEDURE — 99214 PR OFFICE/OUTPT VISIT, EST, LEVL IV, 30-39 MIN: ICD-10-PCS | Mod: ,,, | Performed by: PSYCHIATRY & NEUROLOGY

## 2023-01-02 PROCEDURE — 94761 N-INVAS EAR/PLS OXIMETRY MLT: CPT

## 2023-01-02 PROCEDURE — 97530 THERAPEUTIC ACTIVITIES: CPT | Mod: CQ

## 2023-01-02 PROCEDURE — 25000003 PHARM REV CODE 250: Performed by: INTERNAL MEDICINE

## 2023-01-02 PROCEDURE — 63600175 PHARM REV CODE 636 W HCPCS: Performed by: INTERNAL MEDICINE

## 2023-01-02 PROCEDURE — 97116 GAIT TRAINING THERAPY: CPT | Mod: CQ

## 2023-01-02 PROCEDURE — 95816 EEG AWAKE AND DROWSY: CPT

## 2023-01-02 PROCEDURE — 96372 THER/PROPH/DIAG INJ SC/IM: CPT | Performed by: INTERNAL MEDICINE

## 2023-01-02 PROCEDURE — 99214 OFFICE O/P EST MOD 30 MIN: CPT | Mod: ,,, | Performed by: PSYCHIATRY & NEUROLOGY

## 2023-01-02 PROCEDURE — G0378 HOSPITAL OBSERVATION PER HR: HCPCS

## 2023-01-02 RX ORDER — HYDRALAZINE HYDROCHLORIDE 50 MG/1
100 TABLET, FILM COATED ORAL EVERY 8 HOURS
Status: DISCONTINUED | OUTPATIENT
Start: 2023-01-02 | End: 2023-01-06 | Stop reason: HOSPADM

## 2023-01-02 RX ORDER — ASPIRIN 81 MG/1
81 TABLET ORAL DAILY
Status: DISCONTINUED | OUTPATIENT
Start: 2023-01-03 | End: 2023-01-06 | Stop reason: HOSPADM

## 2023-01-02 RX ADMIN — BUPROPION HYDROCHLORIDE 150 MG: 150 TABLET, FILM COATED, EXTENDED RELEASE ORAL at 09:01

## 2023-01-02 RX ADMIN — HYDRALAZINE HYDROCHLORIDE 100 MG: 50 TABLET, FILM COATED ORAL at 08:01

## 2023-01-02 RX ADMIN — METOPROLOL TARTRATE 50 MG: 50 TABLET, FILM COATED ORAL at 09:01

## 2023-01-02 RX ADMIN — METOPROLOL TARTRATE 50 MG: 50 TABLET, FILM COATED ORAL at 08:01

## 2023-01-02 RX ADMIN — ASPIRIN 325 MG: 325 TABLET, COATED ORAL at 09:01

## 2023-01-02 RX ADMIN — AMLODIPINE BESYLATE 10 MG: 5 TABLET ORAL at 09:01

## 2023-01-02 RX ADMIN — LISINOPRIL 40 MG: 20 TABLET ORAL at 09:01

## 2023-01-02 RX ADMIN — ATORVASTATIN CALCIUM 20 MG: 10 TABLET, FILM COATED ORAL at 08:01

## 2023-01-02 RX ADMIN — HYDRALAZINE HYDROCHLORIDE 100 MG: 50 TABLET, FILM COATED ORAL at 11:01

## 2023-01-02 RX ADMIN — ENOXAPARIN SODIUM 30 MG: 30 INJECTION SUBCUTANEOUS at 04:01

## 2023-01-02 RX ADMIN — HYDRALAZINE HYDROCHLORIDE 75 MG: 50 TABLET, FILM COATED ORAL at 03:01

## 2023-01-02 NOTE — PROGRESS NOTES
"S: sitting in bed eating, in NAD.    O:   BP (!) 175/82   Pulse 77   Temp 98.3 °F (36.8 °C) (Oral)   Resp 20   Ht 5' 2.01" (1.575 m)   Wt 102.1 kg (225 lb)   LMP  (LMP Unknown)   SpO2 96%   Breastfeeding No   BMI 41.14 kg/m²   BP (!) 175/82   Pulse 77   Temp 98.3 °F (36.8 °C) (Oral)   Resp 20   Ht 5' 2.01" (1.575 m)   Wt 102.1 kg (225 lb)   LMP  (LMP Unknown)   SpO2 96%   Breastfeeding No   BMI 41.14 kg/m²     On exam: pt is awake and alert, conversant, in NAD, no face droop, may have LUQ VF cut, EOMI, no slurred speech, no aphasia.  No pronator drift.  No weakness in the UE b/l, but move the LLE slower then the R, the RLE is strong.  No numbness in all 4.   No dysmetria in all 4.  In NAD, head: NCAT, no ear or eye discharge, skin warm and dry, breathing not labored, radial pulse present b/l, does have L ankle swelling and pain, so edema on the LE b/l,  muscle bulk is normal, mood: good.    A/p:   89 with chronic R MCA IS who comes with confusion and worsening L sided weakness.   After seeing the pt this afternoon:   I doubt this is neurological, her LUE is strong and the LLE is strong but she is not moving it much because of the L ankle pain.     MRI negative for new stroke.   - on asa 325, I will put her back on 81 only given her age and the lack of new stroke.   - hold off on the EEG if not done yet.   - no UTI, labs for the most past unremarkable  - CUS no stenosis on the carotids.   LDL at goal.  - TTE: EF of 60 and LA is mild enlarged, but given the lack of stroke or suspicion for this to be a vascular event I will not get ling term cardiac monitoring.   - this is not TIA.  - on atorva 20, continue.   Goal BP< 140.   - pt, ot and slp  - no need to change pt meds, no suspicion of sz here.   - low suspicion for her LLE weakness to be neurological, will sign off please call with questions and concerns.   - I will defer to primary team to address the L ankle pain and swelling.           "

## 2023-01-02 NOTE — PROGRESS NOTES
[facility] Medicine Progress Note        Chief Complaint: Inpatient Follow-up    HPI:   89-year-old female with significant history of carotid artery disease, chronic kidney disease, GERD, HTN, major depressive disorder, mild cognitive impairment/memory loss, hyperlipidemia, peripheral vascular disease presented to outlying facility with complaints of altered mental status, left-sided weakness and inability to ambulate.  Patient had a recent fall.  Patient lives in an assisted living and at baseline she was able to ambulate with walker . she also reported left hip pain which was mild.  CT head revealed right frontal nonhemorrhagic infarct-can not rule out whether subacute or old.  Patient was transferred to our hospital for Neurology Services.  Stroke protocol initiated. Per Neurology CT findings could be old, awaiting MRI.  Blood pressure is significantly accelerated. Patient is off  permissive window as of 12/31, modified antihypertensives to attend goal BP.  Patient cleared for p.o. intake by speech therapy.  MRI brain pending.  PT/OT consulted to decide DC disposition  Interval Hx:     Patient seen at bedside.  Blood pressure is still significantly accelerated.  No new neurological complaints . no acute events overnight.  Patient is awake, alert and seemed oriented at the time of my exam.    Objective/physical exam:  General: In no acute distress, afebrile  Chest: Clear to auscultation bilaterally  Heart: S1, S2, no appreciable murmur  Abdomen: Soft, nontender, BS +  MSK: Warm, no lower extremity edema, no clubbing or cyanosis  Neurologic: Alert and oriented , left-sided weakness    VITAL SIGNS: 24 HRS MIN & MAX LAST   Temp  Min: 97.5 °F (36.4 °C)  Max: 98.3 °F (36.8 °C) 98.3 °F (36.8 °C)   BP  Min: 163/74  Max: 183/79 (!) 175/82     Pulse  Min: 55  Max: 80  77     Resp  Min: 18  Max: 20 20   SpO2  Min: 93 %  Max: 99 % 96 %         Recent Labs   Lab 01/01/23  1344   WBC  4.9   RBC 4.69   HGB 13.8   HCT 41.3   MCV 88.1   MCH 29.4   MCHC 33.4   RDW 13.4      MPV 10.1         Recent Labs   Lab 12/30/22  0620 12/31/22  1339 01/01/23  1748   *   < > 143   K 3.5   < > 3.7   CO2 30   < > 27   BUN 24.5*   < > 14.3   CREATININE 1.01   < > 0.86   CALCIUM 9.0   < > 9.1   MG 1.80  --   --    ALBUMIN 3.4   < > 3.4   ALKPHOS 85   < > 98   ALT 11   < > 15   AST 17   < > 30   BILITOT 0.7   < > 0.9    < > = values in this interval not displayed.          Microbiology Results (last 7 days)       ** No results found for the last 168 hours. **             Scheduled Med:   amLODIPine  10 mg Oral Daily    aspirin  325 mg Oral Daily    atorvastatin  20 mg Oral QHS    buPROPion  150 mg Oral Daily    enoxaparin  30 mg Subcutaneous Daily    hydrALAZINE  100 mg Oral Q8H    lisinopriL  40 mg Oral Daily    metoprolol tartrate  50 mg Oral BID          Assessment/Plan:    Suspected acute ischemic CVA -ruled out  Left-sided weakness-? new  Poorly-controlled HTN/hypertensive urgency  History of carotid artery disease   Mild cognitive impairment/memory loss  HLD   Major depressive disorder   History of GERD  HTN   History of PVD    Valvular heart disease  Prophylaxis    Patient is in fact unsure whether her left-sided weakness is old or new  CT had shown a old right frontal infarct  MRI brain with no new/acute CVA  Echocardiogram with negative bubble study, outpatient follow-up for valvular heart disease with Cardiology  Ultrasound carotid with less than 50% stenosis bilaterally  Full-dose aspirin, high-intensity statin will be continued   Await Neurology recommendation as far as need for MRI C-spine since MRI brain is negative  Troponins was slightly elevated on admit, most likely type 2 demand ischemia from severely elevated blood pressure   Trended down to normal   EF is normal  PT/OT consulted   ST cleared her for modified diet  as of 12/31  Off permissive window   BP still accelerated on  amlodipine, metoprolol tartrate, max dose lisinopril    Added hydralazine, dose increased to 100 mg t.I.d. today  Continue to modify as needed   Continue other home meds-bupropion  DVT prophylaxis-Lovenox    Patient is from assisted living   PT/OT recommending skilled nursing facility placement.  Case management consulted    Molly Fagan MD   01/02/2023

## 2023-01-02 NOTE — PLAN OF CARE
Problem: Adult Inpatient Plan of Care  Goal: Plan of Care Review  Outcome: Ongoing, Not Progressing  Goal: Patient-Specific Goal (Individualized)  Outcome: Ongoing, Not Progressing  Goal: Absence of Hospital-Acquired Illness or Injury  Outcome: Ongoing, Not Progressing  Goal: Optimal Comfort and Wellbeing  Outcome: Ongoing, Not Progressing  Goal: Readiness for Transition of Care  Outcome: Ongoing, Not Progressing     Problem: Bariatric Environmental Safety  Goal: Safety Maintained with Care  Outcome: Ongoing, Not Progressing     Problem: Infection  Goal: Absence of Infection Signs and Symptoms  Outcome: Ongoing, Not Progressing     Problem: Adjustment to Illness (Stroke, Ischemic/Transient Ischemic Attack)  Goal: Optimal Coping  Outcome: Ongoing, Not Progressing     Problem: Bowel Elimination Impaired (Stroke, Ischemic/Transient Ischemic Attack)  Goal: Effective Bowel Elimination  Outcome: Ongoing, Not Progressing     Problem: Cerebral Tissue Perfusion (Stroke, Ischemic/Transient Ischemic Attack)  Goal: Optimal Cerebral Tissue Perfusion  Outcome: Ongoing, Not Progressing     Problem: Cognitive Impairment (Stroke, Ischemic/Transient Ischemic Attack)  Goal: Optimal Cognitive Function  Outcome: Ongoing, Not Progressing     Problem: Communication Impairment (Stroke, Ischemic/Transient Ischemic Attack)  Goal: Improved Communication Skills  Outcome: Ongoing, Not Progressing     Problem: Functional Ability Impaired (Stroke, Ischemic/Transient Ischemic Attack)  Goal: Optimal Functional Ability  Outcome: Ongoing, Not Progressing     Problem: Respiratory Compromise (Stroke, Ischemic/Transient Ischemic Attack)  Goal: Effective Oxygenation and Ventilation  Outcome: Ongoing, Not Progressing     Problem: Sensorimotor Impairment (Stroke, Ischemic/Transient Ischemic Attack)  Goal: Improved Sensorimotor Function  Outcome: Ongoing, Not Progressing     Problem: Swallowing Impairment (Stroke, Ischemic/Transient Ischemic  Attack)  Goal: Optimal Eating and Swallowing without Aspiration  Outcome: Ongoing, Not Progressing     Problem: Urinary Elimination Impaired (Stroke, Ischemic/Transient Ischemic Attack)  Goal: Effective Urinary Elimination  Outcome: Ongoing, Not Progressing     Problem: Pain Acute  Goal: Acceptable Pain Control and Functional Ability  Outcome: Ongoing, Not Progressing

## 2023-01-02 NOTE — PT/OT/SLP PROGRESS
Physical Therapy         Treatment        Soila Buck   MRN: [id number]     PT Received On: 01/02/23  PT Start Time: 0837     PT Stop Time: 0902    PT Total Time (min): 25 min       Billable Minutes:  Therapeutic Activity 23  Total Minutes: 23    Treatment Type: Treatment  PT/PTA: PTA     PTA Visit Number: 1       General Precautions: Standard, fall  Orthopedic Precautions:     Braces:           Pain/Comfort  Pain Rating 1: 10/10  Location - Side 1: Left  Location 1: foot    Objective:  Patient found in bed upon entry.    Functional Mobility:  Bed Mobility:   Supine to sit: Minimal Assistance   Sit to supine: Minimal Assistance   Rolling: Minimal Assistance   Scooting: Minimal Assistance    Balance:     Transitional Sit-to-stand: Mod a with RW    Static Standing: Mod A with RW   Pt stood EOB x 3 trials. Pt unable to place weight through LLE due to increased pain during wb. Pt was able to stand for approx 30 sec each trial.   Activity Tolerance:  Patient limited by pain    Patient left HOB elevated with call button in reach and bed alarm on.    Assessment:  Soila Buck is a 89 y.o. female with a medical diagnosis of CVA (cerebral vascular accident).     Rehab potential is excellent.    Activity tolerance: Excellent    Discharge recommendations: Discharge Facility/Level of Care Needs: nursing facility, skilled     Equipment recommendations:       GOALS:   Multidisciplinary Problems       Physical Therapy Goals          Problem: Physical Therapy    Goal Priority Disciplines Outcome Goal Variances Interventions   Physical Therapy Goal     PT, PT/OT Ongoing, Progressing     Description: STGs:    1. Pt to perform bed mobility with Mod A  2. Pt to remain static/dynamic sitting unsupported with supervision for 15 minutes  3. Pt to perform sit<>stand with use of appropriate AD and Mod A.    Progress goals as needed.                       PLAN:    Patient to be seen daily  to address the above listed problems via gait  training, therapeutic activities, therapeutic exercises  Plan of Care expires: 01/30/23  Plan of Care reviewed with: patient         1/2/2023

## 2023-01-03 LAB
POCT GLUCOSE: 90 MG/DL (ref 70–110)
URATE SERPL-MCNC: 5.9 MG/DL (ref 2.6–6)

## 2023-01-03 PROCEDURE — 95816 PR EEG,W/AWAKE & DROWSY RECORD: ICD-10-PCS | Mod: 26,,, | Performed by: PSYCHIATRY & NEUROLOGY

## 2023-01-03 PROCEDURE — 63600175 PHARM REV CODE 636 W HCPCS: Performed by: INTERNAL MEDICINE

## 2023-01-03 PROCEDURE — 97110 THERAPEUTIC EXERCISES: CPT | Mod: CQ

## 2023-01-03 PROCEDURE — 63600175 PHARM REV CODE 636 W HCPCS: Performed by: NURSE PRACTITIONER

## 2023-01-03 PROCEDURE — 36415 COLL VENOUS BLD VENIPUNCTURE: CPT | Performed by: INTERNAL MEDICINE

## 2023-01-03 PROCEDURE — 25000003 PHARM REV CODE 250: Performed by: INTERNAL MEDICINE

## 2023-01-03 PROCEDURE — 97530 THERAPEUTIC ACTIVITIES: CPT | Mod: CQ

## 2023-01-03 PROCEDURE — 95816 EEG AWAKE AND DROWSY: CPT | Mod: 26,,, | Performed by: PSYCHIATRY & NEUROLOGY

## 2023-01-03 PROCEDURE — 21400001 HC TELEMETRY ROOM

## 2023-01-03 PROCEDURE — 84550 ASSAY OF BLOOD/URIC ACID: CPT | Performed by: INTERNAL MEDICINE

## 2023-01-03 PROCEDURE — 25000003 PHARM REV CODE 250: Performed by: PSYCHIATRY & NEUROLOGY

## 2023-01-03 RX ORDER — ENOXAPARIN SODIUM 100 MG/ML
40 INJECTION SUBCUTANEOUS EVERY 24 HOURS
Status: DISCONTINUED | OUTPATIENT
Start: 2023-01-03 | End: 2023-01-06 | Stop reason: HOSPADM

## 2023-01-03 RX ORDER — SODIUM CHLORIDE, SODIUM LACTATE, POTASSIUM CHLORIDE, CALCIUM CHLORIDE 600; 310; 30; 20 MG/100ML; MG/100ML; MG/100ML; MG/100ML
INJECTION, SOLUTION INTRAVENOUS CONTINUOUS
Status: DISCONTINUED | OUTPATIENT
Start: 2023-01-03 | End: 2023-01-04

## 2023-01-03 RX ADMIN — LISINOPRIL 40 MG: 20 TABLET ORAL at 10:01

## 2023-01-03 RX ADMIN — AMLODIPINE BESYLATE 10 MG: 5 TABLET ORAL at 10:01

## 2023-01-03 RX ADMIN — ENOXAPARIN SODIUM 40 MG: 40 INJECTION SUBCUTANEOUS at 04:01

## 2023-01-03 RX ADMIN — HYDRALAZINE HYDROCHLORIDE 100 MG: 50 TABLET, FILM COATED ORAL at 07:01

## 2023-01-03 RX ADMIN — SODIUM CHLORIDE, POTASSIUM CHLORIDE, SODIUM LACTATE AND CALCIUM CHLORIDE: 600; 310; 30; 20 INJECTION, SOLUTION INTRAVENOUS at 09:01

## 2023-01-03 RX ADMIN — HYDRALAZINE HYDROCHLORIDE 100 MG: 50 TABLET, FILM COATED ORAL at 02:01

## 2023-01-03 RX ADMIN — ATORVASTATIN CALCIUM 20 MG: 10 TABLET, FILM COATED ORAL at 09:01

## 2023-01-03 RX ADMIN — BUPROPION HYDROCHLORIDE 150 MG: 150 TABLET, FILM COATED, EXTENDED RELEASE ORAL at 10:01

## 2023-01-03 RX ADMIN — METOPROLOL TARTRATE 75 MG: 50 TABLET, FILM COATED ORAL at 10:01

## 2023-01-03 RX ADMIN — HYDRALAZINE HYDROCHLORIDE 100 MG: 50 TABLET, FILM COATED ORAL at 11:01

## 2023-01-03 RX ADMIN — ACETAMINOPHEN 650 MG: 325 TABLET, FILM COATED ORAL at 06:01

## 2023-01-03 RX ADMIN — ASPIRIN 81 MG: 81 TABLET, COATED ORAL at 10:01

## 2023-01-03 RX ADMIN — METOPROLOL TARTRATE 75 MG: 50 TABLET, FILM COATED ORAL at 09:01

## 2023-01-03 NOTE — PROGRESS NOTES
[facility] Medicine Progress Note        Chief Complaint: Inpatient Follow-up    HPI:   89-year-old female with significant history of carotid artery disease, chronic kidney disease, GERD, HTN, major depressive disorder, mild cognitive impairment/memory loss, hyperlipidemia, peripheral vascular disease presented to outlying facility with complaints of altered mental status, left-sided weakness and inability to ambulate.  Patient had a recent fall.  Patient lives in an assisted living and at baseline she was able to ambulate with walker . she also reported left hip pain which was mild.  CT head revealed right frontal nonhemorrhagic infarct-can not rule out whether subacute or old.  Patient was transferred to our hospital for Neurology Services.  Stroke protocol initiated. Per Neurology CT findings could be old, awaiting MRI.  Blood pressure is significantly accelerated. Patient is off  permissive window as of 12/31, modified antihypertensives to attend goal BP.  Patient cleared for p.o. intake by speech therapy.  MRI brain pending.  PT/OT consulted to decide DC disposition.  MRI brain negative for new CVA.  PT/OT recommended skilled nursing facility placement.  Case management consulted.    Interval Hx:     Patient seen at bedside.  Complaining of left foot, lower extremity pain.  Patient is very sensitive to touch.  No much redness.  Patient also has left foot swelling.  No other new complaints.  Objective/physical exam:  General: In no acute distress, afebrile  Chest: Clear to auscultation bilaterally  Heart: S1, S2, no appreciable murmur  Abdomen: Soft, nontender, BS +  MSK:  Left lower extremity-sensitive to touch, left foot selling   Neurologic: Alert and oriented , left-sided weakness    VITAL SIGNS: 24 HRS MIN & MAX LAST   Temp  Min: 97.9 °F (36.6 °C)  Max: 99.9 °F (37.7 °C) 98.8 °F (37.1 °C)   BP  Min: 133/80  Max: 185/74 (!) 151/72     Pulse  Min: 65  Max: 105  105      Resp  Min: 18  Max: 19 18   SpO2  Min: 93 %  Max: 96 % (!) 94 %         Recent Labs   Lab 01/01/23  1344   WBC 4.9   RBC 4.69   HGB 13.8   HCT 41.3   MCV 88.1   MCH 29.4   MCHC 33.4   RDW 13.4      MPV 10.1         Recent Labs   Lab 12/30/22  0620 12/31/22  1339 01/01/23  1748   *   < > 143   K 3.5   < > 3.7   CO2 30   < > 27   BUN 24.5*   < > 14.3   CREATININE 1.01   < > 0.86   CALCIUM 9.0   < > 9.1   MG 1.80  --   --    ALBUMIN 3.4   < > 3.4   ALKPHOS 85   < > 98   ALT 11   < > 15   AST 17   < > 30   BILITOT 0.7   < > 0.9    < > = values in this interval not displayed.          Microbiology Results (last 7 days)       ** No results found for the last 168 hours. **             Scheduled Med:   amLODIPine  10 mg Oral Daily    aspirin  81 mg Oral Daily    atorvastatin  20 mg Oral QHS    buPROPion  150 mg Oral Daily    enoxaparin  30 mg Subcutaneous Daily    hydrALAZINE  100 mg Oral Q8H    lisinopriL  40 mg Oral Daily    metoprolol tartrate  75 mg Oral BID          Assessment/Plan:    Suspected acute ischemic CVA -ruled out  Left lower extremity pain/left foot swelling  Poorly-controlled HTN/hypertensive urgency-improved  History of carotid artery disease   Mild cognitive impairment/memory loss  HLD   Major depressive disorder   History of GERD  HTN   History of PVD    Valvular heart disease  Prophylaxis    Patient is in fact unsure whether her left-sided weakness is old or new  CT had shown a old right frontal infarct  MRI brain with no new/acute CVA  Echocardiogram with negative bubble study, outpatient follow-up for valvular heart disease with Cardiology  Ultrasound carotid with less than 50% stenosis bilaterally  Full-dose aspirin, high-intensity statin will be continued   EEG with no epileptiform discharges  Neurology with no additional recommendations, does not recommend MRI C-spine, recommends to continue therapy services  Troponins was slightly elevated on admit, most likely type 2 demand  ischemia from severely elevated blood pressure   Trended down to normal   EF is normal  PT/OT consulted   ST cleared her for modified diet  as of 12/31  Off permissive window  BP much better today on amlodipine, metoprolol tartrate, lisinopril and how ill hydralazine  Continue other home meds-bupropion  Significant left foot pain, swelling, left lower extremity pain   Venous ultrasound, uric acid, left foot x-ray ordered, follow-up results  DVT prophylaxis-Lovenox    Patient is from assisted living   PT/OT recommending skilled nursing facility placement.  Case management consulted    Molly Fagan MD   01/03/2023

## 2023-01-03 NOTE — PROGRESS NOTES
Pharmacist Renal Dose Adjustment Note    Soila Buck is a 89 y.o. female being treated with the medication enoxaparin    Patient Data:    Vital Signs (Most Recent):  Temp: 98 °F (36.7 °C) (01/03/23 1201)  Pulse: 68 (01/03/23 1201)  Resp: 16 (01/03/23 1201)  BP: 126/69 (01/03/23 1201)  SpO2: (!) 94 % (01/03/23 1201)   Vital Signs (72h Range):  Temp:  [97.5 °F (36.4 °C)-99.9 °F (37.7 °C)]   Pulse:  []   Resp:  [16-20]   BP: (126-193)/(60-92)   SpO2:  [91 %-99 %]      Recent Labs   Lab 12/30/22  0620 12/31/22  1339 01/01/23  1748   CREATININE 1.01 0.85 0.86     Serum creatinine: 0.86 mg/dL 01/01/23 1748  Estimated creatinine clearance: 43.8 mL/min    Medication:enoxaparin dose: 30mg frequency daily will be changed to medication:enoxaparin dose:40mg frequency:daily    Pharmacist's Name: Radha Torrez  Pharmacist's Extension: [phone]

## 2023-01-03 NOTE — PLAN OF CARE
Problem: Adult Inpatient Plan of Care  Goal: Plan of Care Review  Outcome: Ongoing, Progressing  Goal: Patient-Specific Goal (Individualized)  Outcome: Ongoing, Progressing  Goal: Absence of Hospital-Acquired Illness or Injury  Outcome: Ongoing, Progressing  Goal: Optimal Comfort and Wellbeing  Outcome: Ongoing, Progressing  Goal: Readiness for Transition of Care  Outcome: Ongoing, Progressing     Problem: Bariatric Environmental Safety  Goal: Safety Maintained with Care  Outcome: Ongoing, Progressing     Problem: Infection  Goal: Absence of Infection Signs and Symptoms  Outcome: Ongoing, Progressing     Problem: Adjustment to Illness (Stroke, Ischemic/Transient Ischemic Attack)  Goal: Optimal Coping  Outcome: Ongoing, Progressing     Problem: Bowel Elimination Impaired (Stroke, Ischemic/Transient Ischemic Attack)  Goal: Effective Bowel Elimination  Outcome: Ongoing, Progressing     Problem: Cerebral Tissue Perfusion (Stroke, Ischemic/Transient Ischemic Attack)  Goal: Optimal Cerebral Tissue Perfusion  Outcome: Ongoing, Progressing     Problem: Cognitive Impairment (Stroke, Ischemic/Transient Ischemic Attack)  Goal: Optimal Cognitive Function  Outcome: Ongoing, Progressing     Problem: Communication Impairment (Stroke, Ischemic/Transient Ischemic Attack)  Goal: Improved Communication Skills  Outcome: Ongoing, Progressing     Problem: Functional Ability Impaired (Stroke, Ischemic/Transient Ischemic Attack)  Goal: Optimal Functional Ability  Outcome: Ongoing, Progressing     Problem: Respiratory Compromise (Stroke, Ischemic/Transient Ischemic Attack)  Goal: Effective Oxygenation and Ventilation  Outcome: Ongoing, Progressing     Problem: Sensorimotor Impairment (Stroke, Ischemic/Transient Ischemic Attack)  Goal: Improved Sensorimotor Function  Outcome: Ongoing, Progressing     Problem: Swallowing Impairment (Stroke, Ischemic/Transient Ischemic Attack)  Goal: Optimal Eating and Swallowing without Aspiration  Outcome:  Ongoing, Progressing     Problem: Bariatric Environmental Safety  Goal: Safety Maintained with Care  Outcome: Ongoing, Progressing     Problem: Infection  Goal: Absence of Infection Signs and Symptoms  Outcome: Ongoing, Progressing     Problem: Adjustment to Illness (Stroke, Ischemic/Transient Ischemic Attack)  Goal: Optimal Coping  Outcome: Ongoing, Progressing     Problem: Bowel Elimination Impaired (Stroke, Ischemic/Transient Ischemic Attack)  Goal: Effective Bowel Elimination  Outcome: Ongoing, Progressing     Problem: Cerebral Tissue Perfusion (Stroke, Ischemic/Transient Ischemic Attack)  Goal: Optimal Cerebral Tissue Perfusion  Outcome: Ongoing, Progressing     Problem: Cognitive Impairment (Stroke, Ischemic/Transient Ischemic Attack)  Goal: Optimal Cognitive Function  Outcome: Ongoing, Progressing     Problem: Communication Impairment (Stroke, Ischemic/Transient Ischemic Attack)  Goal: Improved Communication Skills  Outcome: Ongoing, Progressing     Problem: Functional Ability Impaired (Stroke, Ischemic/Transient Ischemic Attack)  Goal: Optimal Functional Ability  Outcome: Ongoing, Progressing     Problem: Respiratory Compromise (Stroke, Ischemic/Transient Ischemic Attack)  Goal: Effective Oxygenation and Ventilation  Outcome: Ongoing, Progressing     Problem: Sensorimotor Impairment (Stroke, Ischemic/Transient Ischemic Attack)  Goal: Improved Sensorimotor Function  Outcome: Ongoing, Progressing     Problem: Urinary Elimination Impaired (Stroke, Ischemic/Transient Ischemic Attack)  Goal: Effective Urinary Elimination  Outcome: Ongoing, Progressing     Problem: Pain Acute  Goal: Acceptable Pain Control and Functional Ability  Outcome: Ongoing, Progressing     Problem: Fall Injury Risk  Goal: Absence of Fall and Fall-Related Injury  Outcome: Ongoing, Progressing     Problem: Skin Injury Risk Increased  Goal: Skin Health and Integrity  Outcome: Ongoing, Progressing

## 2023-01-03 NOTE — PT/OT/SLP PROGRESS
Physical Therapy         Treatment        Soila Buck   MRN: [id number]     PT Received On: 01/03/23  PT Start Time: 0943     PT Stop Time: 1006    PT Total Time (min): 23 min       Billable Minutes:  Therapeutic Activity 13 and Therapeutic Exercise 10  Total Minutes: 23    Treatment Type: Treatment  PT/PTA: PTA     PTA Visit Number: 2       General Precautions: Standard, fall  Orthopedic Precautions:     Braces:          Pain/Comfort  Pain Rating 1: 10/10  Location - Side 1: Left  Location 1: foot  Pain Addressed 1: Nurse notified  Pain Rating Post-Intervention 1: 0/10  Pain Rating 2: 10/10  Location - Side 2: Left  Location 2: knee (L calf and knee)  Pain Addressed 2: Nurse notified  Pain Rating Post-Intervention 2: 0/10    Objective:  Patient found in bed. Pt c/o LLE pain to touch and with movement. Nursing notified    Functional Mobility:  Bed Mobility:   Supine to sit: Maximum Assistance   Sit to supine: Maximum Assistance   Rolling: Activity did not occur   Scooting: Maximum Assistance    Balance:   Static Sit: SBA   Pt sat EOB while assisted with combing hair. Pt cont to c/o LLE pain. Therapy cont to be limited due to increase in LLE pain.     Additional Treatment:    Therapeutic Exercises    BLE: heel slides, ankle pumps, hip abd/add X 5 reps in supine. Limited due to pain.     Activity Tolerance:  Patient limited by pain    Patient left HOB elevated with call button in reach and nurse present.    Assessment:  Soila Buck is a 89 y.o. female with a medical diagnosis of CVA (cerebral vascular accident). Pt cont to c/o LLE pain and is limited with mobility due to pain. Nursing aware and presents when pt c/o to touch.     Rehab potential is fair.    Activity tolerance: Fair    Discharge recommendations: Discharge Facility/Level of Care Needs: nursing facility, skilled     Equipment recommendations:       GOALS:   Multidisciplinary Problems       Physical Therapy Goals          Problem: Physical Therapy     Goal Priority Disciplines Outcome Goal Variances Interventions   Physical Therapy Goal     PT, PT/OT Ongoing, Progressing     Description: STGs:    1. Pt to perform bed mobility with Mod A  2. Pt to remain static/dynamic sitting unsupported with supervision for 15 minutes  3. Pt to perform sit<>stand with use of appropriate AD and Mod A.    Progress goals as needed.                       PLAN:    Patient to be seen daily  to address the above listed problems via gait training, therapeutic activities, therapeutic exercises  Plan of Care expires: 01/30/23  Plan of Care reviewed with: patient         1/3/2023

## 2023-01-03 NOTE — CONSULTS
This note is to link consult order.  See full consult note per Dr. Wu.     Cari Phillip, [facility]  Inpatient Neurology

## 2023-01-03 NOTE — PLAN OF CARE
Problem: Adult Inpatient Plan of Care  Goal: Plan of Care Review  Outcome: Ongoing, Progressing  Goal: Patient-Specific Goal (Individualized)  Outcome: Ongoing, Progressing  Goal: Absence of Hospital-Acquired Illness or Injury  Outcome: Ongoing, Progressing  Goal: Optimal Comfort and Wellbeing  Outcome: Ongoing, Progressing  Goal: Readiness for Transition of Care  Outcome: Ongoing, Progressing     Problem: Bariatric Environmental Safety  Goal: Safety Maintained with Care  Outcome: Ongoing, Progressing     Problem: Infection  Goal: Absence of Infection Signs and Symptoms  Outcome: Ongoing, Progressing     Problem: Adjustment to Illness (Stroke, Ischemic/Transient Ischemic Attack)  Goal: Optimal Coping  Outcome: Ongoing, Progressing     Problem: Bowel Elimination Impaired (Stroke, Ischemic/Transient Ischemic Attack)  Goal: Effective Bowel Elimination  Outcome: Ongoing, Progressing     Problem: Cerebral Tissue Perfusion (Stroke, Ischemic/Transient Ischemic Attack)  Goal: Optimal Cerebral Tissue Perfusion  Outcome: Ongoing, Progressing     Problem: Cognitive Impairment (Stroke, Ischemic/Transient Ischemic Attack)  Goal: Optimal Cognitive Function  Outcome: Ongoing, Progressing     Problem: Communication Impairment (Stroke, Ischemic/Transient Ischemic Attack)  Goal: Improved Communication Skills  Outcome: Ongoing, Progressing     Problem: Functional Ability Impaired (Stroke, Ischemic/Transient Ischemic Attack)  Goal: Optimal Functional Ability  Outcome: Ongoing, Progressing     Problem: Respiratory Compromise (Stroke, Ischemic/Transient Ischemic Attack)  Goal: Effective Oxygenation and Ventilation  Outcome: Ongoing, Progressing     Problem: Sensorimotor Impairment (Stroke, Ischemic/Transient Ischemic Attack)  Goal: Improved Sensorimotor Function  Outcome: Ongoing, Progressing     Problem: Swallowing Impairment (Stroke, Ischemic/Transient Ischemic Attack)  Goal: Optimal Eating and Swallowing without Aspiration  Outcome:  Ongoing, Progressing     Problem: Urinary Elimination Impaired (Stroke, Ischemic/Transient Ischemic Attack)  Goal: Effective Urinary Elimination  Outcome: Ongoing, Progressing     Problem: Pain Acute  Goal: Acceptable Pain Control and Functional Ability  Outcome: Ongoing, Progressing     Problem: Fall Injury Risk  Goal: Absence of Fall and Fall-Related Injury  Outcome: Ongoing, Progressing     Problem: Skin Injury Risk Increased  Goal: Skin Health and Integrity  Outcome: Ongoing, Progressing

## 2023-01-03 NOTE — PROCEDURES
Procedures  EEG report    Reason for exam: stroke    Technical description:  A standard digital EEG was performed at [facility].  The 10 20 international system of electrode placement is used.  Standard montages were recorded.      Description:  The record was dominated by a 8 hertz posterior dominant rhythm which attenuated with eye opening activity.  During drowsiness, identified by ocular signs and alpha attenuation, there is diffuse asynchronous theta activity.  Stage 2 sleep was not identified.   There were no epileptiform discharges or electrographic seizures    Impression:  This is an abnormal EEG due to mild generalized slowing.

## 2023-01-04 LAB
ALBUMIN SERPL-MCNC: 3 G/DL (ref 3.4–4.8)
ALBUMIN/GLOB SERPL: 1.3 RATIO (ref 1.1–2)
ALP SERPL-CCNC: 89 UNIT/L (ref 40–150)
ALT SERPL-CCNC: 23 UNIT/L (ref 0–55)
AST SERPL-CCNC: 32 UNIT/L (ref 5–34)
BASOPHILS # BLD AUTO: 0.02 X10(3)/MCL (ref 0–0.2)
BASOPHILS NFR BLD AUTO: 0.4 %
BILIRUBIN DIRECT+TOT PNL SERPL-MCNC: 1 MG/DL
BUN SERPL-MCNC: 24.8 MG/DL (ref 9.8–20.1)
CALCIUM SERPL-MCNC: 8.8 MG/DL (ref 8.4–10.2)
CHLORIDE SERPL-SCNC: 105 MMOL/L (ref 98–111)
CO2 SERPL-SCNC: 26 MMOL/L (ref 23–31)
CREAT SERPL-MCNC: 1.12 MG/DL (ref 0.55–1.02)
EOSINOPHIL # BLD AUTO: 0.13 X10(3)/MCL (ref 0–0.9)
EOSINOPHIL NFR BLD AUTO: 2.7 %
ERYTHROCYTE [DISTWIDTH] IN BLOOD BY AUTOMATED COUNT: 14 % (ref 11–14.5)
GFR SERPLBLD CREATININE-BSD FMLA CKD-EPI: 45 MLS/MIN/1.73/M2
GLOBULIN SER-MCNC: 2.4 GM/DL (ref 2.4–3.5)
GLUCOSE SERPL-MCNC: 78 MG/DL (ref 75–121)
HCT VFR BLD AUTO: 37.3 % (ref 37–47)
HGB BLD-MCNC: 12.2 GM/DL (ref 12–16)
IMM GRANULOCYTES # BLD AUTO: 0.02 X10(3)/MCL (ref 0–0.04)
IMM GRANULOCYTES NFR BLD AUTO: 0.4 %
LYMPHOCYTES # BLD AUTO: 1.01 X10(3)/MCL (ref 0.6–4.6)
LYMPHOCYTES NFR BLD AUTO: 20.7 %
MCH RBC QN AUTO: 29.3 PG
MCHC RBC AUTO-ENTMCNC: 32.7 MG/DL (ref 33–36)
MCV RBC AUTO: 89.7 FL (ref 80–94)
MONOCYTES # BLD AUTO: 0.58 X10(3)/MCL (ref 0.1–1.3)
MONOCYTES NFR BLD AUTO: 11.9 %
NEUTROPHILS # BLD AUTO: 3.12 X10(3)/MCL (ref 2.1–9.2)
NEUTROPHILS NFR BLD AUTO: 63.9 %
NRBC BLD AUTO-RTO: 0 % (ref 0–1)
PLATELET # BLD AUTO: 156 X10(3)/MCL (ref 140–371)
PMV BLD AUTO: 9.7 FL (ref 9.4–12.4)
POTASSIUM SERPL-SCNC: 3.5 MMOL/L (ref 3.5–5.1)
PROT SERPL-MCNC: 5.4 GM/DL (ref 5.8–7.6)
RBC # BLD AUTO: 4.16 X10(6)/MCL (ref 4.2–5.4)
SODIUM SERPL-SCNC: 142 MMOL/L (ref 132–146)
WBC # SPEC AUTO: 4.9 X10(3)/MCL (ref 4.5–11.5)

## 2023-01-04 PROCEDURE — 92523 SPEECH SOUND LANG COMPREHEN: CPT

## 2023-01-04 PROCEDURE — 63600175 PHARM REV CODE 636 W HCPCS: Performed by: INTERNAL MEDICINE

## 2023-01-04 PROCEDURE — 80053 COMPREHEN METABOLIC PANEL: CPT | Performed by: INTERNAL MEDICINE

## 2023-01-04 PROCEDURE — 36415 COLL VENOUS BLD VENIPUNCTURE: CPT | Performed by: INTERNAL MEDICINE

## 2023-01-04 PROCEDURE — 21400001 HC TELEMETRY ROOM

## 2023-01-04 PROCEDURE — 97530 THERAPEUTIC ACTIVITIES: CPT | Mod: CQ

## 2023-01-04 PROCEDURE — 25000003 PHARM REV CODE 250: Performed by: INTERNAL MEDICINE

## 2023-01-04 PROCEDURE — 85025 COMPLETE CBC W/AUTO DIFF WBC: CPT | Performed by: INTERNAL MEDICINE

## 2023-01-04 PROCEDURE — 25000003 PHARM REV CODE 250: Performed by: PSYCHIATRY & NEUROLOGY

## 2023-01-04 PROCEDURE — 97110 THERAPEUTIC EXERCISES: CPT | Mod: CQ

## 2023-01-04 RX ORDER — CLINDAMYCIN PHOSPHATE 600 MG/50ML
600 INJECTION, SOLUTION INTRAVENOUS
Status: DISCONTINUED | OUTPATIENT
Start: 2023-01-04 | End: 2023-01-04

## 2023-01-04 RX ADMIN — BUPROPION HYDROCHLORIDE 150 MG: 150 TABLET, FILM COATED, EXTENDED RELEASE ORAL at 08:01

## 2023-01-04 RX ADMIN — ENOXAPARIN SODIUM 40 MG: 40 INJECTION SUBCUTANEOUS at 04:01

## 2023-01-04 RX ADMIN — METOPROLOL TARTRATE 75 MG: 50 TABLET, FILM COATED ORAL at 08:01

## 2023-01-04 RX ADMIN — Medication 6 MG: at 09:01

## 2023-01-04 RX ADMIN — ACETAMINOPHEN 650 MG: 325 TABLET, FILM COATED ORAL at 09:01

## 2023-01-04 RX ADMIN — METOPROLOL TARTRATE 75 MG: 50 TABLET, FILM COATED ORAL at 09:01

## 2023-01-04 RX ADMIN — ASPIRIN 81 MG: 81 TABLET, COATED ORAL at 08:01

## 2023-01-04 RX ADMIN — HYDRALAZINE HYDROCHLORIDE 100 MG: 50 TABLET, FILM COATED ORAL at 11:01

## 2023-01-04 RX ADMIN — HYDRALAZINE HYDROCHLORIDE 100 MG: 50 TABLET, FILM COATED ORAL at 06:01

## 2023-01-04 RX ADMIN — AMLODIPINE BESYLATE 10 MG: 5 TABLET ORAL at 08:01

## 2023-01-04 RX ADMIN — LISINOPRIL 40 MG: 20 TABLET ORAL at 08:01

## 2023-01-04 RX ADMIN — HYDRALAZINE HYDROCHLORIDE 100 MG: 50 TABLET, FILM COATED ORAL at 03:01

## 2023-01-04 RX ADMIN — ATORVASTATIN CALCIUM 20 MG: 10 TABLET, FILM COATED ORAL at 09:01

## 2023-01-04 RX ADMIN — HYDRALAZINE HYDROCHLORIDE 10 MG: 20 INJECTION INTRAMUSCULAR; INTRAVENOUS at 04:01

## 2023-01-04 NOTE — PT/OT/SLP EVAL
"Speech Language Pathology Evaluation  Cognitive Communication    Patient Name:  Soila Buck   MRN:  [id number]  Admitting Diagnosis: CVA (cerebral vascular accident)    Recommendations:     Recommendations:                General Recommendations:  Dysphagia therapy  Diet recommendations:  Puree Diet - IDDSI Level 4, Thin liquids - IDDSI Level 0   General Precautions: Standard, aspiration  Communication strategies:  provide increased time to answer and go to room if call light pushed    History:     Past Medical History:   Diagnosis Date    Carotid artery syndrome     CKD (chronic kidney disease) stage 3, GFR 30-59 ml/min     GERD (gastroesophageal reflux disease)     Hypertension     Major depressive disorder, single episode, unspecified     Mild cognitive impairment with memory loss     Mixed hyperlipidemia     PVD (peripheral vascular disease)        History reviewed. No pertinent surgical history.    Subjective     Pt awake, sitting in chair upon SLP arrival. Pt falls asleep throughout session but wakes to verbal cues and agreeable to continuing evaluation.     Pain/Comfort:  Pain Rating 1: 0/10    Objective:     SPEECH PRODUCTION  Phoneme Production: Adequate  Voice Quality: Adequate  Voice Production: Adequate  Speech Rate: Adequate  Loudness: Acceptable  Respiration: Adequate  Resonance: Adequate  Prosody: Adequate  Speech Intelligibility  Known Context: Greater that 90%  Unknown Context: Greater that 90%    AUDITORY COMPREHENSION  Following Directions:  1-Step: 100%  2-Step: 60%    Yes/No Questions:  Biographical: 100%  Environmental: 100%  Simple: 100%  Complex: 100%    VERBAL EXPRESSION  Automatic Speech:  Countin%  Alphabet: 100%    Phrase Completion: 90%    Confrontation Naming  Body Parts: 100%  Objects: 100%    Wh- Questions:  Object name: 100%  Object function: 100%    COGNITION  Orientation:  Person: L  Place: L  Time: WFL  Situation: "I don't know, did I fall"    Attention:  Focused: " baseline  Sustained: baseline    Memory:  Immediate: Impaired  Short Term: Impaired  Long Term: Impaired    Problem Solving  Functional simple: WFL    Organization:  Convergent thinking: WFL  Divergent thinking: WFL    Assessment:   Pt presents with functional speech and language skills. Cognitive linguistic impairments noted but are reported to be at baseline. SLP intervention not warranted targeting speech, language, and cognition skills though does continue to be warranted targeting dysphagia as pt continues to require modified diet to reduce risk of aspiration.    Goals:   Multidisciplinary Problems       SLP Goals          Problem: SLP    Goal Priority Disciplines Outcome   SLP Goal     SLP    Description: Long Term Goals:  1. Pt will tolerate least restrictive diet with no clinical signs/sx of aspiration       Short Term Goals:  1. Pt will tolerate half meal of minced and moist with improved bolus propulsion/transport  2. Pt will complete TTS with 100% swallow attempts and delay less than 2 seconds     Goals created and POC initiated                        Plan:   Patient to be seen:  5 x/week   Plan of Care expires:  01/07/23  Plan of Care reviewed with:  patient   SLP Follow-Up:  Yes       Discharge recommendations:  Discharge Facility/Level of Care Needs: nursing facility, basic   Barriers to Discharge:  Level of Skilled Assistance Needed   and Safety Awareness      Time Tracking:   SLP Treatment Date:   01/04/23  Speech Start Time:  1300  Speech Stop Time:  1345     Speech Total Time (min):  45 min    Billable Minutes: Eval 45 minutes     01/04/2023

## 2023-01-04 NOTE — PROGRESS NOTES
[facility] Hospital Medicine Progress Note        Chief Complaint: Inpatient Follow-up    HPI:   89-year-old female with significant history of carotid artery disease, chronic kidney disease, GERD, HTN, major depressive disorder, mild cognitive impairment/memory loss, hyperlipidemia, peripheral vascular disease presented to outlying facility with complaints of altered mental status, left-sided weakness and inability to ambulate.  Patient had a recent fall.  Patient lives in an assisted living and at baseline she was able to ambulate with walker . she also reported left hip pain which was mild.  CT head revealed right frontal nonhemorrhagic infarct-can not rule out whether subacute or old.  Patient was transferred to our hospital for Neurology Services.  Stroke protocol initiated. Per Neurology CT findings could be old, awaiting MRI.  Blood pressure is significantly accelerated. Patient is off  permissive window as of 12/31, modified antihypertensives to attend goal BP.  Patient cleared for p.o. intake by speech therapy.  MRI brain pending.  PT/OT consulted to decide DC disposition.  MRI brain negative for new CVA.  PT/OT recommended skilled nursing facility placement.  Case management consulted.  Patient complaining of significant left lower extremity pain on 01/03 and therefore x-ray foot, uric acid, venous ultrasound was ordered.  All came back negative.    Interval Hx:     Patient seen at bedside.  Comfortably laying in bed.  Still having significant left lower extremity, foot pain.  Patient is very sensitive to touch.  No redness.  Hemodynamics are stable.  Objective/physical exam:  General: In no acute distress, afebrile  Chest: Clear to auscultation bilaterally  Heart: S1, S2, no appreciable murmur  Abdomen: Soft, nontender, BS +  MSK:  Left lower extremity-sensitive to touch, left foot selling   Neurologic: Alert and oriented , mild left-sided weakness    VITAL SIGNS: 24 HRS MIN  & MAX LAST   Temp  Min: 97.8 °F (36.6 °C)  Max: 99.4 °F (37.4 °C) 98.2 °F (36.8 °C)   BP  Min: 126/69  Max: 153/73 (!) 153/73     Pulse  Min: 68  Max: 84  78     Resp  Min: 14  Max: 18 18   SpO2  Min: 90 %  Max: 94 % (!) 90 %         Recent Labs   Lab 01/04/23  0448   WBC 4.9   RBC 4.16*   HGB 12.2   HCT 37.3   MCV 89.7   MCH 29.3   MCHC 32.7*   RDW 14.0      MPV 9.7         Recent Labs   Lab 12/30/22  0620 12/31/22  1339 01/04/23  0448   *   < > 142   K 3.5   < > 3.5   CO2 30   < > 26   BUN 24.5*   < > 24.8*   CREATININE 1.01   < > 1.12*   CALCIUM 9.0   < > 8.8   MG 1.80  --   --    ALBUMIN 3.4   < > 3.0*   ALKPHOS 85   < > 89   ALT 11   < > 23   AST 17   < > 32   BILITOT 0.7   < > 1.0    < > = values in this interval not displayed.          Microbiology Results (last 7 days)       ** No results found for the last 168 hours. **             Scheduled Med:   amLODIPine  10 mg Oral Daily    aspirin  81 mg Oral Daily    atorvastatin  20 mg Oral QHS    buPROPion  150 mg Oral Daily    clindamycin (CLEOCIN) IVPB  600 mg Intravenous Q8H    enoxaparin  40 mg Subcutaneous Daily    hydrALAZINE  100 mg Oral Q8H    lisinopriL  40 mg Oral Daily    metoprolol tartrate  75 mg Oral BID          Assessment/Plan:    Suspected acute ischemic CVA -ruled out  Left lower extremity pain/left foot swelling-unclear etiology  Essential HTN-stable  History of carotid artery disease   Mild cognitive impairment/memory loss  HLD   Major depressive disorder   History of GERD  HTN   History of PVD    Valvular heart disease  Prophylaxis    Patient is in fact unsure whether her left-sided weakness is old or new  CT had shown a old right frontal infarct  MRI brain with no new/acute CVA  Echocardiogram with negative bubble study, outpatient follow-up for valvular heart disease with Cardiology  Ultrasound carotid with less than 50% stenosis bilaterally  Full-dose aspirin, high-intensity statin will be continued   EEG with no epileptiform  discharges  Neurology with no additional recommendations, does not recommend MRI C-spine, recommends to continue therapy services  Troponins was slightly elevated on admit, most likely type 2 demand ischemia from severely elevated blood pressure on admit which is now better  Trended down to normal   EF is normal  PT/OT consulted   ST cleared her for modified diet  as of 12/31  Patient is on amlodipine, metoprolol tartrate, lisinopril and hydralazine  Continue other home meds-bupropion  Significant left foot pain, swelling, left lower extremity pain   No DVT   Uric acid within normal limits  X-ray with only soft tissue swelling   Since pain and sensitivity persist I will obtain CT  No signs of cellulitis  DVT prophylaxis-Lovenox    Patient is from assisted living   PT/OT recommending skilled nursing facility placement.  Case management consulted    Molly Fagan MD   01/04/2023

## 2023-01-04 NOTE — PLAN OF CARE
Problem: Adult Inpatient Plan of Care  Goal: Plan of Care Review  Outcome: Ongoing, Progressing  Goal: Patient-Specific Goal (Individualized)  Outcome: Ongoing, Progressing  Goal: Absence of Hospital-Acquired Illness or Injury  Outcome: Ongoing, Progressing  Goal: Optimal Comfort and Wellbeing  Outcome: Ongoing, Progressing  Goal: Readiness for Transition of Care  Outcome: Ongoing, Progressing     Problem: Bariatric Environmental Safety  Goal: Safety Maintained with Care  Outcome: Ongoing, Progressing     Problem: Infection  Goal: Absence of Infection Signs and Symptoms  Outcome: Ongoing, Progressing     Problem: Adjustment to Illness (Stroke, Ischemic/Transient Ischemic Attack)  Goal: Optimal Coping  Outcome: Ongoing, Progressing     Problem: Bowel Elimination Impaired (Stroke, Ischemic/Transient Ischemic Attack)  Goal: Effective Bowel Elimination  Outcome: Ongoing, Progressing     Problem: Cerebral Tissue Perfusion (Stroke, Ischemic/Transient Ischemic Attack)  Goal: Optimal Cerebral Tissue Perfusion  Outcome: Ongoing, Progressing     Problem: Cognitive Impairment (Stroke, Ischemic/Transient Ischemic Attack)  Goal: Optimal Cognitive Function  Outcome: Ongoing, Progressing     Problem: Communication Impairment (Stroke, Ischemic/Transient Ischemic Attack)  Goal: Improved Communication Skills  Outcome: Ongoing, Progressing     Problem: Bariatric Environmental Safety  Goal: Safety Maintained with Care  Outcome: Ongoing, Progressing     Problem: Infection  Goal: Absence of Infection Signs and Symptoms  Outcome: Ongoing, Progressing     Problem: Adjustment to Illness (Stroke, Ischemic/Transient Ischemic Attack)  Goal: Optimal Coping  Outcome: Ongoing, Progressing     Problem: Skin Injury Risk Increased  Goal: Skin Health and Integrity  Outcome: Ongoing, Progressing     Problem: Fall Injury Risk  Goal: Absence of Fall and Fall-Related Injury  Outcome: Ongoing, Progressing     Problem: Pain Acute  Goal: Acceptable Pain  Control and Functional Ability  Outcome: Ongoing, Progressing     Problem: Urinary Elimination Impaired (Stroke, Ischemic/Transient Ischemic Attack)  Goal: Effective Urinary Elimination  Outcome: Ongoing, Progressing     Problem: Swallowing Impairment (Stroke, Ischemic/Transient Ischemic Attack)  Goal: Optimal Eating and Swallowing without Aspiration  Outcome: Ongoing, Progressing     Problem: Sensorimotor Impairment (Stroke, Ischemic/Transient Ischemic Attack)  Goal: Improved Sensorimotor Function  Outcome: Ongoing, Progressing     Problem: Respiratory Compromise (Stroke, Ischemic/Transient Ischemic Attack)  Goal: Effective Oxygenation and Ventilation  Outcome: Ongoing, Progressing     Problem: Communication Impairment (Stroke, Ischemic/Transient Ischemic Attack)  Goal: Improved Communication Skills  Outcome: Ongoing, Progressing     Problem: Cognitive Impairment (Stroke, Ischemic/Transient Ischemic Attack)  Goal: Optimal Cognitive Function  Outcome: Ongoing, Progressing

## 2023-01-04 NOTE — PLAN OF CARE
Initial DC assessment done this am with pt's dgt Luz Maria Stone (AMARA) and granddgt Shelbi Pickard. They stated pt recently moved to [facility] 2 weeks ago. PT is a  and has 1 child (Luz Maria).  Prior to Assisted Living pt was living in a handicap accessible  b/w [facility] home and they daily checked on pt, cooked, clean, and bathed pt. They stated pt has had a long history of depression and over last year pt was not wanting to move or care for herself. They decided to move pt to [facility] and stated they were surprise to see pt moving in halls with RW on [place] day. They stated a few days later pt stopped participating with adl's d/t weakness.   Luz Maria and Shelbi feel pt is not appro to return to Assisted Living. They stated they feel pt would be better at \[facility]\"" SNF. List of SNF given to Luz Maria. She stated she would provide choice to CM later this afternoon or in morning.

## 2023-01-04 NOTE — PT/OT/SLP PROGRESS
Physical Therapy         Treatment        Soila Buck   MRN: [id number]     PT Received On: 01/04/23  PT Start Time: 0935     PT Stop Time: 0954    PT Total Time (min): 19 min       Billable Minutes:  There Act  Total Minutes: 19    Treatment Type: Treatment  PT/PTA: PTA     PTA Visit Number: 3       General Precautions: Standard, fall  Orthopedic Precautions:     Braces:             Pain/Comfort  Pain Rating 1: 10/10  Location - Side 1: Right  Location 1: foot    Objective:  Patient found in bed upon entry. Pt not verbal and grimacing with Rom applied to L ankle joint and knee.     Functional Mobility:  Bed Mobility:   Supine to sit: Maximum Assistance   Sit to supine: Moderate Assistance   Rolling: Activity did not occur   Scooting: Maximum Assistance    Balance:   Static Sit: Min A    Pt sat EOB with min A in prep for standing.     Transitional Sit-to-stand: Mod A with RW  Pt stood EOB X 1 trial. Pt able to place weight through LLE this tx. Session but still in obvious pain. Pt stood approx 45 sec before being returned to supine. Tx limited due to pt scheduled for CT scan.     Additional Treatment:    Therapeutic Exercises    BLE: heel slides, hip abd/add, ankle pumps AAROM x 10 reps in supine    Activity Tolerance:  Patient limited by pain    Patient left HOB elevated with  trasport present.    Assessment:  Soila Buck is a 89 y.o. female with a medical diagnosis of CVA (cerebral vascular accident).     Rehab potential is good.    Activity tolerance: Good    Discharge recommendations: Discharge Facility/Level of Care Needs: nursing facility, skilled     Equipment recommendations:       GOALS:   Multidisciplinary Problems       Physical Therapy Goals          Problem: Physical Therapy    Goal Priority Disciplines Outcome Goal Variances Interventions   Physical Therapy Goal     PT, PT/OT Ongoing, Progressing     Description: STGs:    1. Pt to perform bed mobility with Mod A  2. Pt to remain static/dynamic  sitting unsupported with supervision for 15 minutes  3. Pt to perform sit<>stand with use of appropriate AD and Mod A.    Progress goals as needed.                       PLAN:    Patient to be seen daily  to address the above listed problems via gait training, therapeutic activities, therapeutic exercises  Plan of Care expires: 01/30/23  Plan of Care reviewed with: patient         1/4/2023

## 2023-01-05 LAB
ALBUMIN SERPL-MCNC: 2.9 G/DL (ref 3.4–4.8)
ALBUMIN/GLOB SERPL: 1.3 RATIO (ref 1.1–2)
ALP SERPL-CCNC: 81 UNIT/L (ref 40–150)
ALT SERPL-CCNC: 17 UNIT/L (ref 0–55)
AST SERPL-CCNC: 24 UNIT/L (ref 5–34)
BASOPHILS # BLD AUTO: 0.01 X10(3)/MCL (ref 0–0.2)
BASOPHILS NFR BLD AUTO: 0.2 %
BILIRUBIN DIRECT+TOT PNL SERPL-MCNC: 0.5 MG/DL
BUN SERPL-MCNC: 34.8 MG/DL (ref 9.8–20.1)
CALCIUM SERPL-MCNC: 8.8 MG/DL (ref 8.4–10.2)
CHLORIDE SERPL-SCNC: 107 MMOL/L (ref 98–111)
CO2 SERPL-SCNC: 29 MMOL/L (ref 23–31)
CREAT SERPL-MCNC: 1.19 MG/DL (ref 0.55–1.02)
EOSINOPHIL # BLD AUTO: 0.16 X10(3)/MCL (ref 0–0.9)
EOSINOPHIL NFR BLD AUTO: 3.3 %
ERYTHROCYTE [DISTWIDTH] IN BLOOD BY AUTOMATED COUNT: 13.6 % (ref 11–14.5)
GFR SERPLBLD CREATININE-BSD FMLA CKD-EPI: 42 MLS/MIN/1.73/M2
GLOBULIN SER-MCNC: 2.3 GM/DL (ref 2.4–3.5)
GLUCOSE SERPL-MCNC: 102 MG/DL (ref 75–121)
HCT VFR BLD AUTO: 36.8 % (ref 37–47)
HGB BLD-MCNC: 12 GM/DL (ref 12–16)
IMM GRANULOCYTES # BLD AUTO: 0.01 X10(3)/MCL (ref 0–0.04)
IMM GRANULOCYTES NFR BLD AUTO: 0.2 %
LYMPHOCYTES # BLD AUTO: 0.98 X10(3)/MCL (ref 0.6–4.6)
LYMPHOCYTES NFR BLD AUTO: 20 %
MCH RBC QN AUTO: 29.6 PG
MCHC RBC AUTO-ENTMCNC: 32.6 MG/DL (ref 33–36)
MCV RBC AUTO: 90.6 FL (ref 80–94)
MONOCYTES # BLD AUTO: 0.7 X10(3)/MCL (ref 0.1–1.3)
MONOCYTES NFR BLD AUTO: 14.3 %
NEUTROPHILS # BLD AUTO: 3.03 X10(3)/MCL (ref 2.1–9.2)
NEUTROPHILS NFR BLD AUTO: 62 %
NRBC BLD AUTO-RTO: 0 % (ref 0–1)
PLATELET # BLD AUTO: 177 X10(3)/MCL (ref 140–371)
PMV BLD AUTO: 10.1 FL (ref 9.4–12.4)
POTASSIUM SERPL-SCNC: 3.9 MMOL/L (ref 3.5–5.1)
PROT SERPL-MCNC: 5.2 GM/DL (ref 5.8–7.6)
RBC # BLD AUTO: 4.06 X10(6)/MCL (ref 4.2–5.4)
SODIUM SERPL-SCNC: 142 MMOL/L (ref 132–146)
WBC # SPEC AUTO: 4.9 X10(3)/MCL (ref 4.5–11.5)

## 2023-01-05 PROCEDURE — 94761 N-INVAS EAR/PLS OXIMETRY MLT: CPT

## 2023-01-05 PROCEDURE — 97110 THERAPEUTIC EXERCISES: CPT | Mod: CQ

## 2023-01-05 PROCEDURE — 97535 SELF CARE MNGMENT TRAINING: CPT | Mod: CO

## 2023-01-05 PROCEDURE — 80053 COMPREHEN METABOLIC PANEL: CPT | Performed by: INTERNAL MEDICINE

## 2023-01-05 PROCEDURE — 25000003 PHARM REV CODE 250: Performed by: INTERNAL MEDICINE

## 2023-01-05 PROCEDURE — 21400001 HC TELEMETRY ROOM

## 2023-01-05 PROCEDURE — 85025 COMPLETE CBC W/AUTO DIFF WBC: CPT | Performed by: INTERNAL MEDICINE

## 2023-01-05 PROCEDURE — 36415 COLL VENOUS BLD VENIPUNCTURE: CPT | Performed by: INTERNAL MEDICINE

## 2023-01-05 PROCEDURE — 25000003 PHARM REV CODE 250: Performed by: PSYCHIATRY & NEUROLOGY

## 2023-01-05 PROCEDURE — 97116 GAIT TRAINING THERAPY: CPT | Mod: CQ

## 2023-01-05 PROCEDURE — 92526 ORAL FUNCTION THERAPY: CPT

## 2023-01-05 PROCEDURE — 63600175 PHARM REV CODE 636 W HCPCS: Performed by: INTERNAL MEDICINE

## 2023-01-05 PROCEDURE — 97530 THERAPEUTIC ACTIVITIES: CPT | Mod: CO

## 2023-01-05 RX ADMIN — ASPIRIN 81 MG: 81 TABLET, COATED ORAL at 08:01

## 2023-01-05 RX ADMIN — HYDRALAZINE HYDROCHLORIDE 100 MG: 50 TABLET, FILM COATED ORAL at 11:01

## 2023-01-05 RX ADMIN — METOPROLOL TARTRATE 75 MG: 50 TABLET, FILM COATED ORAL at 10:01

## 2023-01-05 RX ADMIN — BUPROPION HYDROCHLORIDE 150 MG: 150 TABLET, FILM COATED, EXTENDED RELEASE ORAL at 08:01

## 2023-01-05 RX ADMIN — LISINOPRIL 40 MG: 20 TABLET ORAL at 08:01

## 2023-01-05 RX ADMIN — Medication 6 MG: at 10:01

## 2023-01-05 RX ADMIN — HYDRALAZINE HYDROCHLORIDE 100 MG: 50 TABLET, FILM COATED ORAL at 06:01

## 2023-01-05 RX ADMIN — ATORVASTATIN CALCIUM 20 MG: 10 TABLET, FILM COATED ORAL at 10:01

## 2023-01-05 RX ADMIN — ENOXAPARIN SODIUM 40 MG: 40 INJECTION SUBCUTANEOUS at 04:01

## 2023-01-05 RX ADMIN — HYDRALAZINE HYDROCHLORIDE 100 MG: 50 TABLET, FILM COATED ORAL at 03:01

## 2023-01-05 RX ADMIN — METOPROLOL TARTRATE 75 MG: 50 TABLET, FILM COATED ORAL at 08:01

## 2023-01-05 RX ADMIN — AMLODIPINE BESYLATE 10 MG: 5 TABLET ORAL at 08:01

## 2023-01-05 NOTE — PLAN OF CARE
Sent referral to [facility] per diane request, via [facility] for [facility] placement with hospice. Reached out to Sharmila Sewell, to find out which hospice companies the facility has contracts with since the family has no preference at this time. She reported that they currently have contracts with [facility], [facility], and [facility]. Will pass these options to ERA Cabrera so that she can relay them to the family.

## 2023-01-05 NOTE — PLAN OF CARE
Luz Maria Castillo (pt's dgt and POA) called this morning to give choice of NH. She stated she would like her mother to go to NH as [facility] with hospice. She stated she did not have a hospice preference. I told her we would call NH to see if they had hospice preference and communicate that back to her.  Verbal FOC obtained and placed in pt chart.  [facility]st choice [facility]; 2nd choice [facility]  Shira  (MARIO) informed of above and  will start working on NH placement.

## 2023-01-05 NOTE — PT/OT/SLP PROGRESS
Occupational Therapy  Treatment    Soila Buck   MRN: [id number]   Admitting Diagnosis: CVA (cerebral vascular accident)    OT Date of Treatment: 01/05/23   OT Start Time: 1116  OT Stop Time: 1148  OT Total Time (min): 32 min     Billable Minutes:  Self Care/Home Management 15 and Therapeutic Activity 17  Total Minutes: 32     OT/ALMA ROSA: ALMA ROSA     ALMA ROSA Visit Number: 2    General Precautions: Standard, fall  Orthopedic Precautions: N/A  Braces: N/A    Spiritual, Cultural Beliefs, Hindu Practices, Values that Affect Care: no    Subjective:  Communicated with nurse prior to session.    Objective:  Patient found with: pulse ox (continuous), telemetry    Functional Mobility:    Transfer Training:   Functional mobility in room with Min A with RW, tranfer to toilet with Min A with RW, transfer back to chair with Min A with RW    Toilet Training:  Toilet hygiene with CGA/SBA    Grooming:  Standing at sink washing hands with Min A to reach objects using RW    Additional Treatment:  Patient very pleasant.  Tolerated Tx well.    Patient left up in chair with all lines intact and call button in reach    ASSESSMENT:  Soila Buck is a 89 y.o. female with a medical diagnosis of CVA (cerebral vascular accident) and presents with decreased AX tolerance, decreased ADL skills and decreased balance.    Rehab potential is good    Activity tolerance: Good    Discharge recommendations: nursing facility, skilled     Equipment recommendations: walker, rolling     GOALS:   Multidisciplinary Problems       Occupational Therapy Goals          Problem: Occupational Therapy    Goal Priority Disciplines Outcome Interventions   Occupational Therapy Goal     OT, PT/OT Ongoing, Progressing    Description: Goals to be met by: 1/23/23     Patient will increase functional independence with ADLs by performing:    Feeding with Set-up Assistance.  Grooming while EOB with Set-up Assistance.  Toileting from bedside commode with Moderate Assistance for  hygiene and clothing management.   Supine to sit with Moderate Assistance.  Toilet transfer to bedside commode with Moderate Assistance.    Pt will increase functional strength to 3+/5 for improved participation in self-care tasks.                         Plan:  Patient to be seen 5 x/week, 6 x/week to address the above listed problems via self-care/home management, therapeutic activities, therapeutic exercises  Plan of Care expires: 01/23/23  Plan of Care reviewed with: patient         01/05/2023

## 2023-01-05 NOTE — PROGRESS NOTES
[facility]  Hospital Medicine Progress Note        Chief Complaint: Inpatient follow-up on failure to thrive    HPI:   Patient is a 89-year-old white female with a past medical history of carotid artery disease, chronic kidney disease, GERD, HTN, major depressive disorder, mild cognitive impairment, hyperlipidemia, peripheral vascular disease who presented to an outlying facility with complaints of altered mental status, left-sided weakness and inability to ambulate.  Patient had a recent fall.  Patient lives in an assisted living facility and at baseline she was able to ambulate with walker.  Patient also reported left hip pain which was mild.  CT head at the outside facility revealed right frontal nonhemorrhagic infarct-can not rule out whether subacute or old.  Patient was transferred to our hospital for Neurology evaluation.  Stroke protocol initiated. Per Neurology, CT findings could be old, awaiting MRI of the brain.  Blood pressure is significantly accelerated. Patient is off permissive hypertension window as of 12/31, so we modified antihypertensives to achieve goal blood pressure.  Patient cleared for oral intake by speech therapy.  MRI brain pending.  PT/OT consulted to determine discharge disposition.  MRI brain negative for new CVA.  PT/OT recommended skilled nursing facility placement.  Case management consulted.  Patient complaining of significant left lower extremity pain on 01/03 and therefore x-ray foot, uric acid, venous ultrasound were ordered all of which were negative.    Interval Hx:   Patient seen to be working with physical therapy in the hallway will report improvement.  No acute issues have been reported.  Patient is afebrile, on room air, and hemodynamically stable.    Objective/physical exam:  General:  Elderly obese white female in no acute distress  HENT: normocephalic, atraumatic  Eye: PERRL, EOMI, clear conjunctiva  Neck: full ROM, no  thyromegaly  Respiratory: clear to auscultation bilaterally  Cardiovascular: regular rate and rhythm  Gastrointestinal: non-distended, positive bowel sounds, non-tender  Musculoskeletal:  Generalized muscular atrophy  Integumentary:  Atrophic skin  Neurological: cranial nerves grossly intact, no focal neurological deficit  Psychiatric: cooperative, flat affect, depressed      VITAL SIGNS: 24 HRS MIN & MAX LAST   Temp  Min: 97.7 °F (36.5 °C)  Max: 98.1 °F (36.7 °C) 98.1 °F (36.7 °C)   BP  Min: 108/57  Max: 169/81 (!) 113/54     Pulse  Min: 56  Max: 73  61     Resp  Min: 13  Max: 18 16   SpO2  Min: 90 %  Max: 95 % 95 %         Recent Labs   Lab 01/05/23  0336   WBC 4.9   RBC 4.06*   HGB 12.0   HCT 36.8*   MCV 90.6   MCH 29.6   MCHC 32.6*   RDW 13.6      MPV 10.1         Recent Labs   Lab 12/30/22  0620 12/31/22  1339 01/05/23  0336   *   < > 142   K 3.5   < > 3.9   CO2 30   < > 29   BUN 24.5*   < > 34.8*   CREATININE 1.01   < > 1.19*   CALCIUM 9.0   < > 8.8   MG 1.80  --   --    ALBUMIN 3.4   < > 2.9*   ALKPHOS 85   < > 81   ALT 11   < > 17   AST 17   < > 24   BILITOT 0.7   < > 0.5    < > = values in this interval not displayed.          Microbiology Results (last 7 days)       ** No results found for the last 168 hours. **             Scheduled Med:   amLODIPine  10 mg Oral Daily    aspirin  81 mg Oral Daily    atorvastatin  20 mg Oral QHS    buPROPion  150 mg Oral Daily    enoxaparin  40 mg Subcutaneous Daily    hydrALAZINE  100 mg Oral Q8H    lisinopriL  40 mg Oral Daily    metoprolol tartrate  75 mg Oral BID          Assessment/Plan:  Failure to thrive   Hypertensive urgency, resolved  Type 2 NSTEMI  Peripheral arterial disease   Mild cognitive impairment/memory loss  Major depressive disorder   Valvular heart disease      Plan:  Case management reported to me that patient's family would like her placed in a nursing home with hospice  Continue supportive care while awaiting placement    Donte FUNEZ  MD Jaqueline   01/05/2023

## 2023-01-05 NOTE — PT/OT/SLP PROGRESS
Speech Language Pathology Treatment    Patient Name:  Soila Buck   MRN:  [id number]  Admitting Diagnosis: CVA (cerebral vascular accident)    Recommendations:                 General Recommendations:  Dysphagia therapy  Diet recommendations:  Puree Diet - IDDSI Level 4, Liquid Diet Level: Thin liquids - IDDSI Level 0    General Precautions: Standard, aspiration  Communication strategies:  provide increased time to answer and go to room if call light pushed    Subjective     Pt awake and alert, sitting in recliner.     Pain/Comfort:  Pain Rating 1: 0/10    Objective:     Has the patient been evaluated by SLP for swallowing?   Yes  Keep patient NPO? No   Current Respiratory Status:        Pt agreeable to therapy at this time. Tolerated thermal stimulation x5 with maximal cues. Pt retracting tongue and elevating base of tongue limiting intervention. Pt reports she cannot handle lemon swabs at this time as she feels she is going to throw up and is gagging.     Pt was however able to tolerate laryngeal strengthening exercises x15 with min-mod cues.    Assessment:     Soila Buck is a 89 y.o. female with an SLP diagnosis of Dysphagia.  SLP intervention continues to be warranted at this time. SLP to continue POC.    Goals:   Multidisciplinary Problems       SLP Goals          Problem: SLP    Goal Priority Disciplines Outcome   SLP Goal     SLP    Description: Long Term Goals:  1. Pt will tolerate least restrictive diet with no clinical signs/sx of aspiration       Short Term Goals:  1. Pt will tolerate half meal of minced and moist with improved bolus propulsion/transport  2. Pt will complete TTS with 100% swallow attempts and delay less than 2 seconds     Goals created and POC initiated                        Plan:     Patient to be seen:  5 x/week   Plan of Care expires:  01/07/23  Plan of Care reviewed with:  patient   SLP Follow-Up:  Yes       Discharge recommendations:  nursing facility, basic   Barriers to  Discharge:  Level of Skilled Assistance Needed      Time Tracking:     SLP Treatment Date:   01/05/23  Speech Start Time:  1320  Speech Stop Time:  1328     Speech Total Time (min):  8 min    Billable Minutes: Treatment Swallowing Dysfunction 8 minutes    01/05/2023

## 2023-01-05 NOTE — PT/OT/SLP PROGRESS
Physical Therapy         Treatment        Soila Buck   MRN: [id number]     PT Received On: 01/05/23  PT Start Time: 0954     PT Stop Time: 1017    PT Total Time (min): 23 min       Billable Minutes:  Gait [id number] and Therapeutic Exercise 13  Total Minutes: 23    Treatment Type: Treatment  PT/PTA: PTA     PTA Visit Number: 4       General Precautions: Standard, fall  Orthopedic Precautions:     Braces:        Objective:  Patient found in bed upon entry.    Functional Mobility:  Bed Mobility:   Supine to sit: Contact Guard Assistance   Sit to supine: Activity did not occur   Rolling: Activity did not occur   Scooting: Contact Guard Assistance    Balance:     Transitional Sit-to-stand: Min A with RW    Gait Training: Min A with RW  Pt amb 10ft and 26ft with step through gait pattern. Pt presents with forward flexed posture and decreased HALLE step length. VC' s for posture and increased step length.     Additional Treatment:    Therapeutic Exercises    BLE: hip flexion, LAQ, hip abd/add 2 x 10 reps in sitting    Strengthening/Endurance   Pt performed Sit-to-stand from bedside chair x 5 reps.     Activity Tolerance:  Patient tolerated treatment well    Patient left up in chair with call button in reach.    Assessment:  Soila Buck is a 89 y.o. female with a medical diagnosis of CVA (cerebral vascular accident).     Rehab potential is good.    Activity tolerance: Good    Discharge recommendations: Discharge Facility/Level of Care Needs: nursing facility, skilled     Equipment recommendations:       GOALS:   Multidisciplinary Problems       Physical Therapy Goals          Problem: Physical Therapy    Goal Priority Disciplines Outcome Goal Variances Interventions   Physical Therapy Goal     PT, PT/OT Ongoing, Progressing     Description: STGs:    1. Pt to perform bed mobility with Mod A  2. Pt to remain static/dynamic sitting unsupported with supervision for 15 minutes  3. Pt to perform sit<>stand with use of  appropriate AD and Mod A.    Progress goals as needed.                       PLAN:    Patient to be seen daily  to address the above listed problems via gait training, therapeutic activities, therapeutic exercises  Plan of Care expires: 01/30/23  Plan of Care reviewed with: patient         1/5/2023

## 2023-01-05 NOTE — PLAN OF CARE
Shira MARTIN provided me list of hospice [facility] has contracts with. I provided this info to Luz Maria ([place]) and verbal FOC obtained for [facility] d/t her father in law was with [facility] in past. [facility]

## 2023-01-06 VITALS
DIASTOLIC BLOOD PRESSURE: 66 MMHG | RESPIRATION RATE: 16 BRPM | BODY MASS INDEX: 37.93 KG/M2 | WEIGHT: 206.13 LBS | HEIGHT: 62 IN | HEART RATE: 62 BPM | SYSTOLIC BLOOD PRESSURE: 150 MMHG | OXYGEN SATURATION: 94 % | TEMPERATURE: 98 F

## 2023-01-06 LAB — SARS-COV-2 RDRP RESP QL NAA+PROBE: NEGATIVE

## 2023-01-06 PROCEDURE — 25000003 PHARM REV CODE 250: Performed by: INTERNAL MEDICINE

## 2023-01-06 PROCEDURE — 87635 SARS-COV-2 COVID-19 AMP PRB: CPT | Performed by: INTERNAL MEDICINE

## 2023-01-06 PROCEDURE — 92526 ORAL FUNCTION THERAPY: CPT

## 2023-01-06 PROCEDURE — 97164 PT RE-EVAL EST PLAN CARE: CPT

## 2023-01-06 PROCEDURE — 97530 THERAPEUTIC ACTIVITIES: CPT | Mod: CO

## 2023-01-06 PROCEDURE — 25000003 PHARM REV CODE 250: Performed by: PSYCHIATRY & NEUROLOGY

## 2023-01-06 RX ORDER — HYDRALAZINE HYDROCHLORIDE 100 MG/1
100 TABLET, FILM COATED ORAL EVERY 8 HOURS
Qty: 90 TABLET | Refills: 0 | Status: SHIPPED | OUTPATIENT
Start: 2023-01-06

## 2023-01-06 RX ORDER — METOPROLOL TARTRATE 75 MG/1
75 TABLET, FILM COATED ORAL 2 TIMES DAILY
Qty: 60 TABLET | Refills: 0 | Status: SHIPPED | OUTPATIENT
Start: 2023-01-06

## 2023-01-06 RX ORDER — LISINOPRIL 40 MG/1
40 TABLET ORAL DAILY
Qty: 30 TABLET | Refills: 0 | Status: SHIPPED | OUTPATIENT
Start: 2023-01-07

## 2023-01-06 RX ORDER — BUPROPION HYDROCHLORIDE 150 MG/1
150 TABLET ORAL DAILY
Qty: 30 TABLET | Refills: 0 | Status: SHIPPED | OUTPATIENT
Start: 2023-01-06

## 2023-01-06 RX ADMIN — METOPROLOL TARTRATE 75 MG: 50 TABLET, FILM COATED ORAL at 08:01

## 2023-01-06 RX ADMIN — HYDRALAZINE HYDROCHLORIDE 100 MG: 50 TABLET, FILM COATED ORAL at 05:01

## 2023-01-06 RX ADMIN — AMLODIPINE BESYLATE 10 MG: 5 TABLET ORAL at 08:01

## 2023-01-06 RX ADMIN — HYDRALAZINE HYDROCHLORIDE 100 MG: 50 TABLET, FILM COATED ORAL at 11:01

## 2023-01-06 RX ADMIN — LISINOPRIL 40 MG: 20 TABLET ORAL at 08:01

## 2023-01-06 RX ADMIN — ASPIRIN 81 MG: 81 TABLET, COATED ORAL at 08:01

## 2023-01-06 RX ADMIN — BUPROPION HYDROCHLORIDE 150 MG: 150 TABLET, FILM COATED, EXTENDED RELEASE ORAL at 08:01

## 2023-01-06 NOTE — PT/OT/SLP PROGRESS
Speech Language Pathology Department  Dysphagia Therapy Progress Note    Patient Name:  Soila Buck   MRN:  [id number]  Admitting Diagnosis: CVA (cerebral vascular accident)    Recommendations:     General recommendations:  dysphagia therapy  Diet recommendations:  Puree Diet - IDDSI Level 4, Liquid Diet Level: Thin liquids - IDDSI Level 0   Aspiration precautions: small bites/sips and slow rate    Discharge recommendations:  nursing facility, basic     Subjective     Patient awake and alert.    Pain/Comfort:  no    Spiritual/Cultural/Buddhism Beliefs/Practices that affect care: no      Objective:     Oral Musculature Evaluation:  Oral Musculature: WFL  Dentition: present and adequate  Secretion Management: adequate  Mucosal Quality: good  Mandibular Strength and Mobility: WNL  Oral Labial Strength and Mobility: WFL  Lingual Strength and Mobility: WFL    Therapeutic Activities:  Pt completed base of tongue and laryngeal x60 with moderate cues.    Assessment:     Pt presents with dysphagia requiring diet modification to reduce the risk of aspiration.     Goals:   Multidisciplinary Problems       SLP Goals          Problem: SLP    Goal Priority Disciplines Outcome   SLP Goal     SLP    Description: Long Term Goals:  1. Pt will tolerate least restrictive diet with no clinical signs/sx of aspiration       Short Term Goals:  1. Pt will tolerate half meal of minced and moist with improved bolus propulsion/transport  2. Pt will complete TTS with 100% swallow attempts and delay less than 2 seconds     Goals created and POC initiated                      Patient Education:     Patient provided with verbal education regarding POC.  Understanding was verbalized.    Plan:     Will continue to follow and tx as appropriate.    SLP Follow-Up:  Yes   Patient to be seen:  5 x/week   Plan of Care expires:  01/07/23  Plan of Care reviewed with:  patient       Time Tracking:     SLP Treatment Date:    1/6/23  Speech Start Time:      Speech Stop Time:        Speech Total Time (min):   10    Billable minutes:  Treatment of Swallow Dysfunction, 10        01/06/2023

## 2023-01-06 NOTE — PLAN OF CARE
01/06/23 1154   Final Note   Assessment Type Final Discharge Note   Anticipated Discharge Disposition [facility]   Post-Acute Status   Post-Acute Authorization Placement   Post-Acute Placement Status Set-up Complete/Auth obtained  ([facility] with [facility])

## 2023-01-06 NOTE — PT/OT/SLP PROGRESS
Occupational Therapy  Treatment    Soila Buck   MRN: [id number]   Admitting Diagnosis: CVA (cerebral vascular accident)    OT Date of Treatment: 01/06/23   OT Start Time: 0930  OT Stop Time: 0953  OT Total Time (min): 23 min     Billable Minutes:  Therapeutic Activity 23  Total Minutes: 23     OT/ALMA ROSA: ALMA ROSA     ALMA ROSA Visit Number: 3    General Precautions: Standard, fall  Orthopedic Precautions: N/A  Braces: N/A    Spiritual, Cultural Beliefs, Advent Practices, Values that Affect Care: yes    Subjective:  Communicated with nurse prior to session.    Objective:  Patient found with: telemetry, PureWick    Additional Treatment:  AROM with BUE and BLE secondary to complaints of feeling stiff, static sitting balance unsupported in chair with SBA, scooting to edge of chair with Min A    Patient left up in chair with all lines intact and call button in reach    ASSESSMENT:  Soila Buck is a 89 y.o. female with a medical diagnosis of CVA (cerebral vascular accident) and presents with decreased ADLskills, decreased AX tolerance and decreased balance..    Rehab potential is good    Activity tolerance: Good    Discharge recommendations: nursing facility, skilled, nursing facility, basic     Equipment recommendations: walker, rolling     GOALS:   Multidisciplinary Problems       Occupational Therapy Goals          Problem: Occupational Therapy    Goal Priority Disciplines Outcome Interventions   Occupational Therapy Goal     OT, PT/OT Ongoing, Progressing    Description: Goals to be met by: 1/23/23     Patient will increase functional independence with ADLs by performing:    Feeding with Set-up Assistance.  Grooming while EOB with Set-up Assistance.  Toileting from bedside commode with Moderate Assistance for hygiene and clothing management.   Supine to sit with Moderate Assistance.  Toilet transfer to bedside commode with Moderate Assistance.    Pt will increase functional strength to 3+/5 for improved participation in  self-care tasks.                         Plan:  Patient to be seen 5 x/week, 6 x/week to address the above listed problems via self-care/home management, therapeutic activities, therapeutic exercises  Plan of Care expires: 01/23/23  Plan of Care reviewed with: patient    01/06/2023

## 2023-01-06 NOTE — PLAN OF CARE
Spoke to Sharmila Sewell at [facility], who received insurance authorization on patient. She stated that she would be reaching out to pt's family to start admit paperwork. Also made Swati at [facility] aware that they are ready to accept patient, as she was here to speak with the patient and family. Made ERA Brown aware that we will need a covid swab and CXR on patient before discharge.

## 2023-01-06 NOTE — PT/OT/SLP RE-EVAL
Physical Therapy Re-evaluation    Patient Name:  Soila Buck   MRN:  [id number]    Recommendations:     Discharge Recommendations: nursing facility, skilled, nursing facility, basic  Discharge Equipment Recommendations: none   Barriers to discharge: None    Assessment:     Soila Buck is a 89 y.o. female admitted with a medical diagnosis of CVA (cerebral vascular accident).  She presents with the following impairments/functional limitations: weakness, impaired endurance, impaired self care skills, impaired functional mobility, gait instability, impaired balance, decreased lower extremity function, decreased safety awareness. Patient extremely weak today. Not moving as well as previous sessions. Requiring MIN A - MOD A for all mobility. Agree with plan of discharging to nursing home.     Rehab Prognosis:  fair-poor; patient would benefit from acute skilled PT services to address these deficits and reach maximum level of function.      Recent Surgery: * No surgery found *      Plan:     During this hospitalization, patient to be seen 5 x/week to address the above listed problems via gait training, therapeutic activities, therapeutic exercises, neuromuscular re-education  Plan of Care Expires:  01/25/23  Plan of Care Reviewed with: patient    Subjective     Communicated with nurse prior to session.  Patient found HOB elevated with telemetryKathrin upon PT entry to room, agreeable to evaluation.      Chief Complaint: weakness  Patient comments/goals: none  Pain/Comfort:  Pain Rating 1: 0/10    Patients cultural, spiritual, Nondenominational conflicts given the current situation: no      Objective:     Patient found with: telemetryKathrin     General Precautions: Standard, fall  Orthopedic Precautions: N/A  Braces: N/A  Respiratory Status: Room air    Exams:  Cognitive Exam:  Patient is oriented to Person, Place, Time, and Situation  Sensation:    -       Intact  RLE ROM: WFL  RLE Strength: 3/5 grossly  LLE ROM:  WFL  LLE Strength: 3/5 grossly    Functional Mobility:  Bed Mobility:     Supine to Sit: moderate assistance  Transfers:     Sit to Stand:  minimum assistance with rolling walker  Gait: 5 ft with RW & MIN A. Forward trunk lean. Cueing for increased hip flexion.   Balance: fair/poor    AM-PAC 6 CLICK MOBILITY  Total Score:15     Patient left up in chair with all lines intact, call button in reach, nurse notified, and educated on calling for assistance when ready to return to bed .    GOALS:   Multidisciplinary Problems       Physical Therapy Goals          Problem: Physical Therapy    Goal Priority Disciplines Outcome Goal Variances Interventions   Physical Therapy Goal     PT, PT/OT Unable to Meet, Plan Revised     Description: Goals to be met by: 23     Patient will increase functional independence with mobility by performin. Supine to sit with Set-up Hooker  2. Sit to supine with Set-up Hooker  3. Sit to stand transfer with Stand-by Assistance  4. Gait  x 200 feet with Supervision using Rolling Walker.                          History:     Past Medical History:   Diagnosis Date    Carotid artery syndrome     CKD (chronic kidney disease) stage 3, GFR 30-59 ml/min     GERD (gastroesophageal reflux disease)     Hypertension     Major depressive disorder, single episode, unspecified     Mild cognitive impairment with memory loss     Mixed hyperlipidemia     PVD (peripheral vascular disease)        History reviewed. No pertinent surgical history.    Time Tracking:     PT Received On: 23  PT Start Time: 909     PT Stop Time: 924  PT Total Time (min): 15 min     Billable Minutes: Re-jane 15 minutes      2023

## 2023-01-06 NOTE — PLAN OF CARE
Problem: Adult Inpatient Plan of Care  Goal: Plan of Care Review  Outcome: Met  Goal: Patient-Specific Goal (Individualized)  Outcome: Met  Goal: Absence of Hospital-Acquired Illness or Injury  Outcome: Met  Goal: Optimal Comfort and Wellbeing  Outcome: Met  Goal: Readiness for Transition of Care  Outcome: Met     Problem: Bariatric Environmental Safety  Goal: Safety Maintained with Care  Outcome: Met     Problem: Infection  Goal: Absence of Infection Signs and Symptoms  Outcome: Met     Problem: Adjustment to Illness (Stroke, Ischemic/Transient Ischemic Attack)  Goal: Optimal Coping  Outcome: Met     Problem: Bowel Elimination Impaired (Stroke, Ischemic/Transient Ischemic Attack)  Goal: Effective Bowel Elimination  Outcome: Met     Problem: Cerebral Tissue Perfusion (Stroke, Ischemic/Transient Ischemic Attack)  Goal: Optimal Cerebral Tissue Perfusion  Outcome: Met     Problem: Cognitive Impairment (Stroke, Ischemic/Transient Ischemic Attack)  Goal: Optimal Cognitive Function  Outcome: Met     Problem: Communication Impairment (Stroke, Ischemic/Transient Ischemic Attack)  Goal: Improved Communication Skills  Outcome: Met     Problem: Functional Ability Impaired (Stroke, Ischemic/Transient Ischemic Attack)  Goal: Optimal Functional Ability  Outcome: Met     Problem: Respiratory Compromise (Stroke, Ischemic/Transient Ischemic Attack)  Goal: Effective Oxygenation and Ventilation  Outcome: Met     Problem: Sensorimotor Impairment (Stroke, Ischemic/Transient Ischemic Attack)  Goal: Improved Sensorimotor Function  Outcome: Met     Problem: Swallowing Impairment (Stroke, Ischemic/Transient Ischemic Attack)  Goal: Optimal Eating and Swallowing without Aspiration  Outcome: Met     Problem: Urinary Elimination Impaired (Stroke, Ischemic/Transient Ischemic Attack)  Goal: Effective Urinary Elimination  Outcome: Met     Problem: Pain Acute  Goal: Acceptable Pain Control and Functional Ability  Outcome: Met     Problem: Fall  Injury Risk  Goal: Absence of Fall and Fall-Related Injury  Outcome: Met     Problem: Skin Injury Risk Increased  Goal: Skin Health and Integrity  Outcome: Met

## 2023-01-06 NOTE — PLAN OF CARE
Problem: Physical Therapy  Goal: Physical Therapy Goal  Description: Goals to be met by: 23     Patient will increase functional independence with mobility by performin. Supine to sit with Set-up Coleman  2. Sit to supine with Set-up Coleman  3. Sit to stand transfer with Stand-by Assistance  4. Gait  x 200 feet with Supervision using Rolling Walker.     2023 1137 by Erica Guevara PT  Outcome: Plan Revised

## 2023-01-06 NOTE — PLAN OF CARE
Problem: Adult Inpatient Plan of Care  Goal: Plan of Care Review  Outcome: Ongoing, Progressing  Goal: Patient-Specific Goal (Individualized)  Outcome: Ongoing, Progressing  Goal: Absence of Hospital-Acquired Illness or Injury  Outcome: Ongoing, Progressing  Goal: Optimal Comfort and Wellbeing  Outcome: Ongoing, Progressing  Goal: Readiness for Transition of Care  Outcome: Ongoing, Progressing     Problem: Bariatric Environmental Safety  Goal: Safety Maintained with Care  Outcome: Ongoing, Progressing     Problem: Infection  Goal: Absence of Infection Signs and Symptoms  Outcome: Ongoing, Progressing     Problem: Adjustment to Illness (Stroke, Ischemic/Transient Ischemic Attack)  Goal: Optimal Coping  Outcome: Ongoing, Progressing     Problem: Bowel Elimination Impaired (Stroke, Ischemic/Transient Ischemic Attack)  Goal: Effective Bowel Elimination  Outcome: Ongoing, Progressing     Problem: Cerebral Tissue Perfusion (Stroke, Ischemic/Transient Ischemic Attack)  Goal: Optimal Cerebral Tissue Perfusion  Outcome: Ongoing, Progressing     Problem: Cognitive Impairment (Stroke, Ischemic/Transient Ischemic Attack)  Goal: Optimal Cognitive Function  Outcome: Ongoing, Progressing     Problem: Communication Impairment (Stroke, Ischemic/Transient Ischemic Attack)  Goal: Improved Communication Skills  Outcome: Ongoing, Progressing     Problem: Functional Ability Impaired (Stroke, Ischemic/Transient Ischemic Attack)  Goal: Optimal Functional Ability  Outcome: Ongoing, Progressing     Problem: Respiratory Compromise (Stroke, Ischemic/Transient Ischemic Attack)  Goal: Effective Oxygenation and Ventilation  Outcome: Ongoing, Progressing     Problem: Sensorimotor Impairment (Stroke, Ischemic/Transient Ischemic Attack)  Goal: Improved Sensorimotor Function  Outcome: Ongoing, Progressing     Problem: Swallowing Impairment (Stroke, Ischemic/Transient Ischemic Attack)  Goal: Optimal Eating and Swallowing without Aspiration  Outcome:  Ongoing, Progressing     Problem: Urinary Elimination Impaired (Stroke, Ischemic/Transient Ischemic Attack)  Goal: Effective Urinary Elimination  Outcome: Ongoing, Progressing

## 2023-01-11 NOTE — DISCHARGE SUMMARY
[facility]  Hospital Medicine Discharge Summary    Admit Date: 12/29/2022  Discharge Date and Time: 1/06/2023 2:25 PM  Admitting Physician: Yair Huntley MD  Discharging Physician: Donte Parsons MD.  Primary Care Physician: Helio Madden MD  Consults: Neurology    Discharge Diagnoses:  Failure to thrive   Hypertensive urgency, resolved  Type 2 NSTEMI  Peripheral arterial disease   Mild cognitive impairment/memory loss  Major depressive disorder   Valvular heart disease    Hospital Course:   Patient is a 89-year-old white female with a past medical history of carotid artery disease, chronic kidney disease, GERD, HTN, major depressive disorder, mild cognitive impairment, hyperlipidemia, peripheral vascular disease who presented to an outlying facility with complaints of altered mental status, left-sided weakness and inability to ambulate.  Patient had a recent fall.  Patient lives in an assisted living facility and at baseline she was able to ambulate with walker.  Patient also reported left hip pain which was mild.  CT head at the outside facility revealed right frontal nonhemorrhagic infarct-can not rule out whether subacute or old.  Patient was transferred to our hospital for Neurology evaluation.  Stroke protocol initiated. Per Neurology, CT findings could be old, awaiting MRI of the brain.  Blood pressure is significantly accelerated. Patient is off permissive hypertension window as of 12/31, so we modified antihypertensives to achieve goal blood pressure.  Patient cleared for oral intake by speech therapy.  MRI brain pending.  PT/OT consulted to determine discharge disposition.  MRI brain negative for new CVA.  PT/OT recommended skilled nursing facility placement.  Case management consulted.  Patient complaining of significant left lower extremity pain on 01/03 and therefore x-ray foot, uric acid, venous ultrasound were ordered all of which were negative.  Patient was  discharged in stable condition to [facility] with hospice [facility].    Patient was seen and examined on the day of discharge.    Vitals:  VITAL SIGNS: 24 HRS MIN & MAX LAST   No data recorded 97.6 °F (36.4 °C)   No data recorded (!) 150/66     No data recorded  62   No data recorded 16   No data recorded (!) 94 %         Physical Exam:  General:  Elderly obese white female in no acute distress  HENT: normocephalic, atraumatic  Eye: PERRL, EOMI, clear conjunctiva  Neck: full ROM, no thyromegaly  Respiratory: clear to auscultation bilaterally  Cardiovascular: regular rate and rhythm  Gastrointestinal: non-distended, positive bowel sounds, non-tender  Musculoskeletal:  Generalized muscular atrophy  Integumentary:  Atrophic skin  Neurological: cranial nerves grossly intact, no focal neurological deficit  Psychiatric: cooperative, flat affect, depressed    Procedures Performed: No admission procedures for hospital encounter.     Significant Diagnostic Studies: See Full reports for all details    Recent Labs   Lab 01/05/23  0336   WBC 4.9   RBC 4.06*   HGB 12.0   HCT 36.8*   MCV 90.6   MCH 29.6   MCHC 32.6*   RDW 13.6      MPV 10.1       Recent Labs   Lab 01/05/23  0336      K 3.9   CO2 29   BUN 34.8*   CREATININE 1.19*   CALCIUM 8.8   ALBUMIN 2.9*   ALKPHOS 81   ALT 17   AST 24   BILITOT 0.5        Microbiology Results (last 7 days)       ** No results found for the last 168 hours. **             X-Ray Chest 1 View  Narrative: EXAMINATION:  XR CHEST 1 VIEW    CLINICAL HISTORY:  NH Placement;, .    FINDINGS:  No alveolar consolidation, effusion, or pneumothorax is seen.   The thoracic aorta is normal  cardiac silhouette is enlarged, central pulmonary vessels and mediastinum are normal in size and are grossly unremarkable.   visualized osseous structures are grossly unremarkable.  Impression: No acute chest disease is identified.    Mild cardiomegaly    Electronically signed by: Cain  Enrrique  Date:    01/06/2023  Time:    10:34  CV Ultrasound doppler venous DVT leg left  There was no evidence of deep or superficial vein thrombosis in the left   lower extremity         Medication List        START taking these medications      hydrALAZINE 100 MG tablet  Commonly known as: APRESOLINE  Take 1 tablet (100 mg total) by mouth every 8 (eight) hours.            CHANGE how you take these medications      buPROPion 150 MG TB24 tablet  Commonly known as: WELLBUTRIN XL  Take 1 tablet (150 mg total) by mouth once daily.  What changed: when to take this     lisinopriL 40 MG tablet  Commonly known as: PRINIVIL,ZESTRIL  Take 1 tablet (40 mg total) by mouth once daily.  What changed:   medication strength  See the new instructions.     metoprolol tartrate 75 mg Tab  Take 75 mg by mouth 2 (two) times daily.  What changed:   medication strength  how much to take            CONTINUE taking these medications      amLODIPine 10 MG tablet  Commonly known as: NORVASC     aspirin 81 MG EC tablet  Commonly known as: ECOTRIN     atorvastatin 20 MG tablet  Commonly known as: LIPITOR            STOP taking these medications      ascorbic acid (vitamin C) 500 MG tablet  Commonly known as: VITAMIN C     furosemide 20 MG tablet  Commonly known as: LASIX     TRINTELLIX 10 mg Tab  Generic drug: vortioxetine               Where to Get Your Medications        You can get these medications from any pharmacy    Bring a paper prescription for each of these medications  buPROPion 150 MG TB24 tablet  hydrALAZINE 100 MG tablet  lisinopriL 40 MG tablet  metoprolol tartrate 75 mg Tab          Explained in detail to the patient about the discharge plan, medications, and follow-up visits.  Patient understands and agrees with the treatment plan.  Discharge Disposition: Ilana Elias with [facility]  Discharged Condition: stable     Follow-up Information       [facility] - Emergency Dept .    Specialty:  Emergency Medicine  Why: If symptoms worsen  Contact information:  [address]  [facility] [address] [phone]                           Discharge time 35 minutes    For worsening symptoms, chest pain, shortness of breath, increased abdominal pain, high grade fever, stroke or stroke like symptoms, immediately go to the nearest Emergency Room or call 911 as soon as possible.      Donte Shields M.D, on 1/11/2023. at 4:38 PM.

## 2023-02-06 ENCOUNTER — LAB REQUISITION (OUTPATIENT)
Dept: LAB | Facility: HOSPITAL | Age: 88
End: 2023-02-06
Payer: MEDICARE

## 2023-02-06 DIAGNOSIS — I73.9 PERIPHERAL VASCULAR DISEASE, UNSPECIFIED: ICD-10-CM

## 2023-02-06 LAB — PHOSPHATE SERPL-MCNC: 3.8 MG/DL (ref 2.3–4.7)

## 2023-02-06 PROCEDURE — 84100 ASSAY OF PHOSPHORUS: CPT | Performed by: FAMILY MEDICINE

## 2023-02-08 ENCOUNTER — LAB REQUISITION (OUTPATIENT)
Dept: LAB | Facility: HOSPITAL | Age: 88
End: 2023-02-08
Payer: MEDICARE

## 2023-02-08 DIAGNOSIS — N18.9 CHRONIC KIDNEY DISEASE, UNSPECIFIED: ICD-10-CM

## 2023-02-08 LAB — POTASSIUM SERPL-SCNC: 4.1 MMOL/L (ref 3.5–5.1)

## 2023-02-08 PROCEDURE — 84132 ASSAY OF SERUM POTASSIUM: CPT | Performed by: NURSE PRACTITIONER

## 2023-04-03 PROBLEM — I63.9 CVA (CEREBRAL VASCULAR ACCIDENT): Status: RESOLVED | Noted: 2022-12-29 | Resolved: 2023-04-03

## 2023-05-01 ENCOUNTER — LAB REQUISITION (OUTPATIENT)
Dept: LAB | Facility: HOSPITAL | Age: 88
End: 2023-05-01
Payer: MEDICARE

## 2023-05-01 DIAGNOSIS — D64.9 ANEMIA, UNSPECIFIED: ICD-10-CM

## 2023-05-01 LAB
ALBUMIN SERPL-MCNC: 3.4 G/DL (ref 3.4–4.8)
ALBUMIN/GLOB SERPL: 1.3 RATIO (ref 1.1–2)
ALP SERPL-CCNC: 87 UNIT/L (ref 40–150)
ALT SERPL-CCNC: 10 UNIT/L (ref 0–55)
AST SERPL-CCNC: 17 UNIT/L (ref 5–34)
BASOPHILS # BLD AUTO: 0.01 X10(3)/MCL (ref 0–0.2)
BASOPHILS NFR BLD AUTO: 0.2 %
BILIRUBIN DIRECT+TOT PNL SERPL-MCNC: 0.3 MG/DL
BUN SERPL-MCNC: 37.6 MG/DL (ref 9.8–20.1)
CALCIUM SERPL-MCNC: 9.1 MG/DL (ref 8.4–10.2)
CHLORIDE SERPL-SCNC: 109 MMOL/L (ref 98–111)
CHOLEST SERPL-MCNC: 141 MG/DL
CHOLEST/HDLC SERPL: 4 {RATIO} (ref 0–5)
CO2 SERPL-SCNC: 26 MMOL/L (ref 23–31)
CREAT SERPL-MCNC: 1.18 MG/DL (ref 0.55–1.02)
EOSINOPHIL # BLD AUTO: 0.28 X10(3)/MCL (ref 0–0.9)
EOSINOPHIL NFR BLD AUTO: 6.1 %
ERYTHROCYTE [DISTWIDTH] IN BLOOD BY AUTOMATED COUNT: 13.3 % (ref 11.5–17)
GFR SERPLBLD CREATININE-BSD FMLA CKD-EPI: 43 MLS/MIN/1.73/M2
GLOBULIN SER-MCNC: 2.6 GM/DL (ref 2.4–3.5)
GLUCOSE SERPL-MCNC: 91 MG/DL (ref 75–121)
HCT VFR BLD AUTO: 37.8 % (ref 37–47)
HDLC SERPL-MCNC: 38 MG/DL (ref 35–60)
HGB BLD-MCNC: 11.9 G/DL (ref 12–16)
IMM GRANULOCYTES # BLD AUTO: 0.01 X10(3)/MCL (ref 0–0.04)
IMM GRANULOCYTES NFR BLD AUTO: 0.2 %
LDLC SERPL CALC-MCNC: 80 MG/DL (ref 50–140)
LYMPHOCYTES # BLD AUTO: 1.01 X10(3)/MCL (ref 0.6–4.6)
LYMPHOCYTES NFR BLD AUTO: 21.9 %
MCH RBC QN AUTO: 27.9 PG (ref 27–31)
MCHC RBC AUTO-ENTMCNC: 31.5 G/DL (ref 33–36)
MCV RBC AUTO: 88.5 FL (ref 80–94)
MONOCYTES # BLD AUTO: 0.57 X10(3)/MCL (ref 0.1–1.3)
MONOCYTES NFR BLD AUTO: 12.4 %
NEUTROPHILS # BLD AUTO: 2.73 X10(3)/MCL (ref 2.1–9.2)
NEUTROPHILS NFR BLD AUTO: 59.2 %
NRBC BLD AUTO-RTO: 0 %
PLATELET # BLD AUTO: 188 X10(3)/MCL (ref 130–400)
PMV BLD AUTO: 10 FL (ref 7.4–10.4)
POTASSIUM SERPL-SCNC: 4.7 MMOL/L (ref 3.5–5.1)
PROT SERPL-MCNC: 6 GM/DL (ref 5.8–7.6)
RBC # BLD AUTO: 4.27 X10(6)/MCL (ref 4.2–5.4)
SODIUM SERPL-SCNC: 143 MMOL/L (ref 132–146)
TRIGL SERPL-MCNC: 115 MG/DL (ref 37–140)
TSH SERPL-ACNC: 3.65 UIU/ML (ref 0.35–4.94)
VLDLC SERPL CALC-MCNC: 23 MG/DL
WBC # SPEC AUTO: 4.6 X10(3)/MCL (ref 4.5–11.5)

## 2023-05-01 PROCEDURE — 85025 COMPLETE CBC W/AUTO DIFF WBC: CPT | Performed by: FAMILY MEDICINE

## 2023-05-01 PROCEDURE — 80053 COMPREHEN METABOLIC PANEL: CPT | Performed by: FAMILY MEDICINE

## 2023-05-01 PROCEDURE — 84443 ASSAY THYROID STIM HORMONE: CPT | Performed by: FAMILY MEDICINE

## 2023-05-01 PROCEDURE — 80061 LIPID PANEL: CPT | Performed by: FAMILY MEDICINE

## 2023-07-25 PROCEDURE — 81001 URINALYSIS AUTO W/SCOPE: CPT | Performed by: FAMILY MEDICINE

## 2023-07-26 ENCOUNTER — LAB REQUISITION (OUTPATIENT)
Dept: LAB | Facility: HOSPITAL | Age: 88
End: 2023-07-26
Payer: MEDICARE

## 2023-07-26 DIAGNOSIS — N39.0 URINARY TRACT INFECTION, SITE NOT SPECIFIED: ICD-10-CM

## 2023-07-26 LAB
APPEARANCE UR: CLEAR
BACTERIA #/AREA URNS AUTO: ABNORMAL /HPF
BILIRUB UR QL STRIP.AUTO: NEGATIVE
COLOR UR: ABNORMAL
GLUCOSE UR QL STRIP.AUTO: NEGATIVE
HYALINE CASTS URNS QL MICRO: ABNORMAL /LPF
KETONES UR QL STRIP.AUTO: NEGATIVE
LEUKOCYTE ESTERASE UR QL STRIP.AUTO: ABNORMAL
NITRITE UR QL STRIP.AUTO: NEGATIVE
PH UR STRIP.AUTO: 5.5 [PH]
PROT UR QL STRIP.AUTO: NEGATIVE
RBC #/AREA URNS AUTO: ABNORMAL /HPF
RBC UR QL AUTO: NEGATIVE
SP GR UR STRIP.AUTO: 1.01
SQUAMOUS #/AREA URNS AUTO: ABNORMAL /HPF
UROBILINOGEN UR STRIP-ACNC: 0.2
WBC #/AREA URNS AUTO: ABNORMAL /HPF